# Patient Record
Sex: FEMALE | Race: WHITE | NOT HISPANIC OR LATINO | Employment: FULL TIME | URBAN - NONMETROPOLITAN AREA
[De-identification: names, ages, dates, MRNs, and addresses within clinical notes are randomized per-mention and may not be internally consistent; named-entity substitution may affect disease eponyms.]

---

## 2017-01-11 ENCOUNTER — ANESTHESIA EVENT (OUTPATIENT)
Dept: GASTROENTEROLOGY | Facility: HOSPITAL | Age: 52
End: 2017-01-11

## 2017-01-12 ENCOUNTER — ANESTHESIA (OUTPATIENT)
Dept: GASTROENTEROLOGY | Facility: HOSPITAL | Age: 52
End: 2017-01-12

## 2017-01-12 PROCEDURE — 25010000002 PROPOFOL 10 MG/ML EMULSION: Performed by: NURSE ANESTHETIST, CERTIFIED REGISTERED

## 2017-01-12 RX ORDER — PROPOFOL 10 MG/ML
VIAL (ML) INTRAVENOUS AS NEEDED
Status: DISCONTINUED | OUTPATIENT
Start: 2017-01-12 | End: 2017-01-12 | Stop reason: SURG

## 2017-01-12 RX ORDER — LIDOCAINE HYDROCHLORIDE 20 MG/ML
INJECTION, SOLUTION INFILTRATION; PERINEURAL AS NEEDED
Status: DISCONTINUED | OUTPATIENT
Start: 2017-01-12 | End: 2017-01-12 | Stop reason: SURG

## 2017-01-12 RX ADMIN — PROPOFOL 20 MG: 10 INJECTION, EMULSION INTRAVENOUS at 08:59

## 2017-01-12 RX ADMIN — PROPOFOL 100 MG: 10 INJECTION, EMULSION INTRAVENOUS at 08:56

## 2017-01-12 RX ADMIN — PROPOFOL 30 MG: 10 INJECTION, EMULSION INTRAVENOUS at 09:01

## 2017-01-12 RX ADMIN — LIDOCAINE HYDROCHLORIDE 40 MG: 20 INJECTION, SOLUTION INFILTRATION; PERINEURAL at 08:56

## 2017-01-12 NOTE — ANESTHESIA PREPROCEDURE EVALUATION
Anesthesia Evaluation     Patient summary reviewed and Nursing notes reviewed    No history of anesthetic complications   Airway   Mallampati: I  TM distance: >3 FB  Neck ROM: full  no difficulty expected  Dental - normal exam     Pulmonary - negative pulmonary ROS and normal exam   Cardiovascular - normal exam  Exercise tolerance: good (4-7 METS)  (+) hypertension well controlled, valvular problems/murmurs MVP,     Beta blocker given within 24 hours of surgery  ROS comment: Occasional palpitations    Neuro/Psych- negative ROS  GI/Hepatic/Renal/Endo - negative ROS     Musculoskeletal (-) negative ROS    Abdominal  - normal exam   Substance History - negative use     OB/GYN negative ob/gyn ROS         Other - negative ROS                            Anesthesia Plan    ASA 2     general     intravenous induction   Anesthetic plan and risks discussed with patient and spouse/significant other.

## 2017-01-12 NOTE — ANESTHESIA POSTPROCEDURE EVALUATION
Patient: Lorrie Marmolejo    Procedure Summary     Date Anesthesia Start Anesthesia Stop Room / Location    01/12/17 0852 0913  [facility] / [facility]       Procedure Diagnosis Surgeon Provider    COLONOSCOPY WITH ANESTHESIA (N/A ) Encounter for screening for malignant neoplasm of colon  (Encounter for screening for malignant neoplasm of colon [Z12.11]) MD Paula De La Vega CRNA          Anesthesia Type: general  Last vitals  BP      Temp      Pulse     Resp      SpO2        Post Anesthesia Care and Evaluation    Patient location during evaluation: bedside  Patient participation: complete - patient participated  Level of consciousness: awake  Pain score: 0  Pain management: adequate  Airway patency: patent  Anesthetic complications: No anesthetic complications  Cardiovascular status: acceptable  Respiratory status: acceptable  Hydration status: acceptable

## 2017-01-17 ENCOUNTER — TELEPHONE (OUTPATIENT)
Dept: GASTROENTEROLOGY | Facility: CLINIC | Age: 52
End: 2017-01-17

## 2017-06-12 ENCOUNTER — HOSPITAL ENCOUNTER (OUTPATIENT)
Dept: WOMENS IMAGING | Age: 52
Discharge: HOME | End: 2017-06-12
Payer: COMMERCIAL

## 2017-06-12 DIAGNOSIS — R92.8 ABNORMAL MAMMOGRAM, UNSPECIFIED: ICD-10-CM

## 2017-06-12 PROCEDURE — G0279 TOMOSYNTHESIS, MAMMO: HCPCS

## 2017-12-21 ENCOUNTER — HOSPITAL ENCOUNTER (OUTPATIENT)
Dept: WOMENS IMAGING | Age: 52
Discharge: HOME | End: 2017-12-21
Payer: COMMERCIAL

## 2017-12-21 DIAGNOSIS — Z12.31 VISIT FOR SCREENING MAMMOGRAM: ICD-10-CM

## 2017-12-21 PROCEDURE — 77063 BREAST TOMOSYNTHESIS BI: CPT

## 2018-04-21 ENCOUNTER — HOSPITAL ENCOUNTER (INPATIENT)
Facility: HOSPITAL | Age: 53
LOS: 1 days | Discharge: HOME | End: 2018-04-22
Attending: FAMILY MEDICINE | Admitting: FAMILY MEDICINE

## 2018-04-21 ENCOUNTER — APPOINTMENT (OUTPATIENT)
Dept: CT IMAGING | Facility: HOSPITAL | Age: 53
End: 2018-04-21

## 2018-04-21 ENCOUNTER — APPOINTMENT (OUTPATIENT)
Dept: GENERAL RADIOLOGY | Facility: HOSPITAL | Age: 53
End: 2018-04-21

## 2018-04-21 DIAGNOSIS — K85.90 ACUTE PANCREATITIS, UNSPECIFIED COMPLICATION STATUS, UNSPECIFIED PANCREATITIS TYPE: Primary | ICD-10-CM

## 2018-04-21 LAB
ALBUMIN SERPL-MCNC: 4.2 G/DL (ref 3.5–5)
ALBUMIN/GLOB SERPL: 1.4 G/DL (ref 1.1–2.5)
ALP SERPL-CCNC: 96 U/L (ref 24–120)
ALT SERPL W P-5'-P-CCNC: 67 U/L (ref 0–54)
ANION GAP SERPL CALCULATED.3IONS-SCNC: 9 MMOL/L (ref 4–13)
ARTICHOKE IGE QN: 108 MG/DL (ref 0–99)
AST SERPL-CCNC: 110 U/L (ref 7–45)
BASOPHILS # BLD AUTO: 0.08 10*3/MM3 (ref 0–0.2)
BASOPHILS NFR BLD AUTO: 0.7 % (ref 0–2)
BILIRUB SERPL-MCNC: 0.6 MG/DL (ref 0.1–1)
BILIRUB UR QL STRIP: NEGATIVE
BUN BLD-MCNC: 19 MG/DL (ref 5–21)
BUN/CREAT SERPL: 26 (ref 7–25)
CALCIUM SPEC-SCNC: 9.2 MG/DL (ref 8.4–10.4)
CHLORIDE SERPL-SCNC: 104 MMOL/L (ref 98–110)
CHOLEST SERPL-MCNC: 180 MG/DL (ref 130–200)
CLARITY UR: CLEAR
CO2 SERPL-SCNC: 30 MMOL/L (ref 24–31)
COLOR UR: YELLOW
CREAT BLD-MCNC: 0.73 MG/DL (ref 0.5–1.4)
CRP SERPL-MCNC: <0.5 MG/DL (ref 0–0.99)
DEPRECATED RDW RBC AUTO: 40.2 FL (ref 40–54)
EOSINOPHIL # BLD AUTO: 0.72 10*3/MM3 (ref 0–0.7)
EOSINOPHIL NFR BLD AUTO: 6.6 % (ref 0–4)
ERYTHROCYTE [DISTWIDTH] IN BLOOD BY AUTOMATED COUNT: 12.2 % (ref 12–15)
GFR SERPL CREATININE-BSD FRML MDRD: 84 ML/MIN/1.73
GLOBULIN UR ELPH-MCNC: 3.1 GM/DL
GLUCOSE BLD-MCNC: 110 MG/DL (ref 70–100)
GLUCOSE UR STRIP-MCNC: NEGATIVE MG/DL
HCT VFR BLD AUTO: 40.5 % (ref 37–47)
HDLC SERPL-MCNC: 49 MG/DL
HGB BLD-MCNC: 14.1 G/DL (ref 12–16)
HGB UR QL STRIP.AUTO: NEGATIVE
IMM GRANULOCYTES # BLD: 0.04 10*3/MM3 (ref 0–0.03)
IMM GRANULOCYTES NFR BLD: 0.4 % (ref 0–5)
KETONES UR QL STRIP: NEGATIVE
LDLC/HDLC SERPL: 2.53 {RATIO}
LEUKOCYTE ESTERASE UR QL STRIP.AUTO: NEGATIVE
LIPASE SERPL-CCNC: ABNORMAL U/L (ref 23–203)
LYMPHOCYTES # BLD AUTO: 2.34 10*3/MM3 (ref 0.72–4.86)
LYMPHOCYTES NFR BLD AUTO: 21.5 % (ref 15–45)
MCH RBC QN AUTO: 31.3 PG (ref 28–32)
MCHC RBC AUTO-ENTMCNC: 34.8 G/DL (ref 33–36)
MCV RBC AUTO: 90 FL (ref 82–98)
MONOCYTES # BLD AUTO: 0.81 10*3/MM3 (ref 0.19–1.3)
MONOCYTES NFR BLD AUTO: 7.5 % (ref 4–12)
NEUTROPHILS # BLD AUTO: 6.88 10*3/MM3 (ref 1.87–8.4)
NEUTROPHILS NFR BLD AUTO: 63.3 % (ref 39–78)
NITRITE UR QL STRIP: NEGATIVE
NRBC BLD MANUAL-RTO: 0 /100 WBC (ref 0–0)
PH UR STRIP.AUTO: <=5 [PH] (ref 5–8)
PLATELET # BLD AUTO: 273 10*3/MM3 (ref 130–400)
PMV BLD AUTO: 10.2 FL (ref 6–12)
POTASSIUM BLD-SCNC: 3.8 MMOL/L (ref 3.5–5.3)
PROT SERPL-MCNC: 7.3 G/DL (ref 6.3–8.7)
PROT UR QL STRIP: NEGATIVE
RBC # BLD AUTO: 4.5 10*6/MM3 (ref 4.2–5.4)
SODIUM BLD-SCNC: 143 MMOL/L (ref 135–145)
SP GR UR STRIP: 1.01 (ref 1–1.03)
TRIGL SERPL-MCNC: 35 MG/DL (ref 0–149)
TROPONIN I SERPL-MCNC: <0.012 NG/ML (ref 0–0.03)
TSH SERPL DL<=0.05 MIU/L-ACNC: 2.73 MIU/ML (ref 0.47–4.68)
UROBILINOGEN UR QL STRIP: NORMAL
WBC NRBC COR # BLD: 10.87 10*3/MM3 (ref 4.8–10.8)

## 2018-04-21 PROCEDURE — 74177 CT ABD & PELVIS W/CONTRAST: CPT

## 2018-04-21 PROCEDURE — 99285 EMERGENCY DEPT VISIT HI MDM: CPT

## 2018-04-21 PROCEDURE — 85025 COMPLETE CBC W/AUTO DIFF WBC: CPT | Performed by: FAMILY MEDICINE

## 2018-04-21 PROCEDURE — 80061 LIPID PANEL: CPT | Performed by: NURSE PRACTITIONER

## 2018-04-21 PROCEDURE — 93005 ELECTROCARDIOGRAM TRACING: CPT | Performed by: FAMILY MEDICINE

## 2018-04-21 PROCEDURE — 25010000002 IOPAMIDOL 61 % SOLUTION: Performed by: FAMILY MEDICINE

## 2018-04-21 PROCEDURE — 84484 ASSAY OF TROPONIN QUANT: CPT | Performed by: FAMILY MEDICINE

## 2018-04-21 PROCEDURE — 25010000002 ONDANSETRON PER 1 MG: Performed by: FAMILY MEDICINE

## 2018-04-21 PROCEDURE — 86140 C-REACTIVE PROTEIN: CPT | Performed by: NURSE PRACTITIONER

## 2018-04-21 PROCEDURE — 81003 URINALYSIS AUTO W/O SCOPE: CPT | Performed by: FAMILY MEDICINE

## 2018-04-21 PROCEDURE — 84443 ASSAY THYROID STIM HORMONE: CPT | Performed by: NURSE PRACTITIONER

## 2018-04-21 PROCEDURE — 71045 X-RAY EXAM CHEST 1 VIEW: CPT

## 2018-04-21 PROCEDURE — 83690 ASSAY OF LIPASE: CPT | Performed by: FAMILY MEDICINE

## 2018-04-21 PROCEDURE — 25010000002 ENOXAPARIN PER 10 MG: Performed by: NURSE PRACTITIONER

## 2018-04-21 PROCEDURE — 93010 ELECTROCARDIOGRAM REPORT: CPT | Performed by: INTERNAL MEDICINE

## 2018-04-21 PROCEDURE — 80053 COMPREHEN METABOLIC PANEL: CPT | Performed by: FAMILY MEDICINE

## 2018-04-21 RX ORDER — FAMOTIDINE 10 MG/ML
20 INJECTION, SOLUTION INTRAVENOUS EVERY 12 HOURS SCHEDULED
Status: DISCONTINUED | OUTPATIENT
Start: 2018-04-21 | End: 2018-04-22 | Stop reason: HOSPADM

## 2018-04-21 RX ORDER — HYDROCODONE BITARTRATE AND ACETAMINOPHEN 7.5; 325 MG/1; MG/1
1 TABLET ORAL EVERY 4 HOURS PRN
Status: DISCONTINUED | OUTPATIENT
Start: 2018-04-21 | End: 2018-04-22 | Stop reason: HOSPADM

## 2018-04-21 RX ORDER — ACETAMINOPHEN 325 MG/1
650 TABLET ORAL EVERY 4 HOURS PRN
Status: DISCONTINUED | OUTPATIENT
Start: 2018-04-21 | End: 2018-04-22 | Stop reason: HOSPADM

## 2018-04-21 RX ORDER — POLYETHYLENE GLYCOL 3350 17 G/17G
17 POWDER, FOR SOLUTION ORAL DAILY
COMMUNITY

## 2018-04-21 RX ORDER — MAGNESIUM GLUCONATE 27 MG(500)
27 TABLET ORAL DAILY
COMMUNITY

## 2018-04-21 RX ORDER — HYDRALAZINE HYDROCHLORIDE 20 MG/ML
10 INJECTION INTRAMUSCULAR; INTRAVENOUS EVERY 6 HOURS PRN
Status: DISCONTINUED | OUTPATIENT
Start: 2018-04-21 | End: 2018-04-22 | Stop reason: HOSPADM

## 2018-04-21 RX ORDER — SODIUM CHLORIDE 9 MG/ML
150 INJECTION, SOLUTION INTRAVENOUS CONTINUOUS
Status: DISCONTINUED | OUTPATIENT
Start: 2018-04-21 | End: 2018-04-21

## 2018-04-21 RX ORDER — MEPERIDINE HYDROCHLORIDE 25 MG/ML
25 INJECTION INTRAMUSCULAR; INTRAVENOUS; SUBCUTANEOUS ONCE
Status: COMPLETED | OUTPATIENT
Start: 2018-04-21 | End: 2018-04-21

## 2018-04-21 RX ORDER — MEPERIDINE HYDROCHLORIDE 25 MG/ML
25 INJECTION INTRAMUSCULAR; INTRAVENOUS; SUBCUTANEOUS
Status: DISCONTINUED | OUTPATIENT
Start: 2018-04-21 | End: 2018-04-21 | Stop reason: RX

## 2018-04-21 RX ORDER — SODIUM CHLORIDE 0.9 % (FLUSH) 0.9 %
1-10 SYRINGE (ML) INJECTION AS NEEDED
Status: DISCONTINUED | OUTPATIENT
Start: 2018-04-21 | End: 2018-04-22 | Stop reason: HOSPADM

## 2018-04-21 RX ORDER — SODIUM CHLORIDE, SODIUM LACTATE, POTASSIUM CHLORIDE, CALCIUM CHLORIDE 600; 310; 30; 20 MG/100ML; MG/100ML; MG/100ML; MG/100ML
100 INJECTION, SOLUTION INTRAVENOUS CONTINUOUS
Status: DISCONTINUED | OUTPATIENT
Start: 2018-04-21 | End: 2018-04-22

## 2018-04-21 RX ORDER — ONDANSETRON 2 MG/ML
4 INJECTION INTRAMUSCULAR; INTRAVENOUS ONCE
Status: COMPLETED | OUTPATIENT
Start: 2018-04-21 | End: 2018-04-21

## 2018-04-21 RX ORDER — FLUTICASONE PROPIONATE 50 MCG
2 SPRAY, SUSPENSION (ML) NASAL NIGHTLY
Status: DISCONTINUED | OUTPATIENT
Start: 2018-04-21 | End: 2018-04-22 | Stop reason: HOSPADM

## 2018-04-21 RX ORDER — ONDANSETRON 2 MG/ML
4 INJECTION INTRAMUSCULAR; INTRAVENOUS EVERY 6 HOURS PRN
Status: DISCONTINUED | OUTPATIENT
Start: 2018-04-21 | End: 2018-04-22 | Stop reason: HOSPADM

## 2018-04-21 RX ORDER — ASPIRIN 325 MG
325 TABLET ORAL ONCE
Status: COMPLETED | OUTPATIENT
Start: 2018-04-21 | End: 2018-04-21

## 2018-04-21 RX ADMIN — FLUTICASONE PROPIONATE 2 SPRAY: 50 SPRAY, METERED NASAL at 21:19

## 2018-04-21 RX ADMIN — MEPERIDINE HYDROCHLORIDE 25 MG: 25 INJECTION INTRAMUSCULAR; INTRAVENOUS; SUBCUTANEOUS at 04:04

## 2018-04-21 RX ADMIN — FAMOTIDINE 20 MG: 10 INJECTION, SOLUTION INTRAVENOUS at 09:28

## 2018-04-21 RX ADMIN — FAMOTIDINE 20 MG: 10 INJECTION, SOLUTION INTRAVENOUS at 21:18

## 2018-04-21 RX ADMIN — SODIUM CHLORIDE 150 ML/HR: 9 INJECTION, SOLUTION INTRAVENOUS at 04:00

## 2018-04-21 RX ADMIN — SODIUM CHLORIDE, POTASSIUM CHLORIDE, SODIUM LACTATE AND CALCIUM CHLORIDE 125 ML/HR: 600; 310; 30; 20 INJECTION, SOLUTION INTRAVENOUS at 17:43

## 2018-04-21 RX ADMIN — ASPIRIN 325 MG: 325 TABLET, COATED ORAL at 03:41

## 2018-04-21 RX ADMIN — ENOXAPARIN SODIUM 40 MG: 40 INJECTION SUBCUTANEOUS at 09:27

## 2018-04-21 RX ADMIN — ONDANSETRON 4 MG: 2 INJECTION INTRAMUSCULAR; INTRAVENOUS at 04:03

## 2018-04-21 RX ADMIN — IOPAMIDOL 100 ML: 612 INJECTION, SOLUTION INTRAVENOUS at 04:19

## 2018-04-21 RX ADMIN — SODIUM CHLORIDE, POTASSIUM CHLORIDE, SODIUM LACTATE AND CALCIUM CHLORIDE 125 ML/HR: 600; 310; 30; 20 INJECTION, SOLUTION INTRAVENOUS at 09:53

## 2018-04-21 RX ADMIN — HYDROCODONE BITARTRATE AND ACETAMINOPHEN 1 TABLET: 7.5; 325 TABLET ORAL at 22:00

## 2018-04-21 NOTE — PLAN OF CARE
Problem: Patient Care Overview  Goal: Plan of Care Review  Outcome: Ongoing (interventions implemented as appropriate)   04/21/18 7597   Coping/Psychosocial   Plan of Care Reviewed With patient;spouse   Plan of Care Review   Progress improving   OTHER   Outcome Summary Pt has had no c/o of pain this shift. No nausea or vomiting. Remains npo. Scds and lovenox started. Pt has been resting. Will continue to monitor.      Goal: Individualization and Mutuality  Outcome: Ongoing (interventions implemented as appropriate)    Goal: Discharge Needs Assessment  Outcome: Ongoing (interventions implemented as appropriate)    Goal: Interprofessional Rounds/Family Conf  Outcome: Ongoing (interventions implemented as appropriate)      Problem: Pancreatitis, Acute/Chronic (Adult)  Goal: Signs and Symptoms of Listed Potential Problems Will be Absent, Minimized or Managed (Pancreatitis, Acute/Chronic)  Outcome: Ongoing (interventions implemented as appropriate)

## 2018-04-21 NOTE — H&P
[facility] Medicine Services  HISTORY AND PHYSICAL    Date of Admission: 4/21/2018  Primary Care Physician: Oliverio Morrow MD    Subjective     Chief Complaint: epigastric pain     History of Present Illness  The patient is a 52 year old  female who presented to [facility] emergency department complaining of severe sudden epigastric pain.  She states that she had never had any pain like this before.  She denies alcohol use.  She had a chicken breast and baked potato for supper last night.  She states that she lost 50 pounds last year on a low carb diet and has not been as faithful this year with her carbohydrate intake.  She denies any fatty foods on the night prior to admission.  She stated that she felt like she was having a heart attack and so they stopped at the EMS station in [facility] but decided to continue to come to the hospital via private car because the pain had let up some.  She did not have any acute EKG changes per ER and her troponin was negative.  She was found to have a lipase of 10,000.  She stated that she has been having fleeting nausea for the past week as well and just started to having vomiting early this morning with the severe pain.     Review of Systems   Constitutional: Positive for appetite change. Negative for activity change, chills and fever.   HENT: Negative for hearing loss, nosebleeds, tinnitus and trouble swallowing.    Eyes: Negative for visual disturbance.   Respiratory: Negative for cough, chest tightness, shortness of breath and wheezing.    Cardiovascular: Positive for chest pain. Negative for palpitations and leg swelling.   Gastrointestinal: Positive for abdominal pain, nausea and vomiting. Negative for abdominal distention, blood in stool, constipation and diarrhea.   Endocrine: Negative for cold intolerance, heat intolerance, polydipsia, polyphagia and polyuria.   Genitourinary: Negative for decreased urine  volume, difficulty urinating, dysuria, flank pain, frequency and hematuria.   Musculoskeletal: Negative for arthralgias, joint swelling and myalgias.   Skin: Negative for rash.   Allergic/Immunologic: Negative for immunocompromised state.   Neurological: Positive for weakness. Negative for dizziness, syncope, light-headedness and headaches.   Hematological: Negative for adenopathy. Does not bruise/bleed easily.   Psychiatric/Behavioral: Negative for confusion and sleep disturbance. The patient is not nervous/anxious.       Otherwise complete ROS reviewed and negative except as mentioned in the HPI.    Past Medical History:   Past Medical History:   Diagnosis Date   • Disease of thyroid gland    • Hyperlipidemia    • Hypertension    • Sebaceous hyperplasia    • Squamous cell carcinoma 08/08/2016    left cheek     Past Surgical History:  Past Surgical History:   Procedure Laterality Date   • CHOLECYSTECTOMY     • COLONOSCOPY N/A 1/12/2017    Procedure: COLONOSCOPY WITH ANESTHESIA;  Surgeon: Jose R Graves MD;  Location: [facility] ENDOSCOPY;  Service:    • EXCISION LESION  07/21/2016    sebacous hyperplasia   • HYSTERECTOMY     • SQUAMOUS CELL CARCINOMA EXCISION  08/08/2016    left cheek     Social History:  reports that she has never smoked. She does not have any smokeless tobacco history on file. She reports that she does not drink alcohol.    Family History: family history includes Hyperlipidemia in her other; Hypertension in her other.     Allergies:  No Known Allergies     Medications:  Prior to Admission medications    Medication Sig Start Date End Date Taking? Authorizing Provider   magnesium gluconate (MAGONATE) 500 MG tablet Take 27 mg by mouth 2 (Two) Times a Day.   Yes Historical Provider, MD   polyethylene glycol (MIRALAX) packet Take 17 g by mouth Daily.   Yes Historical Provider, MD   atorvastatin (LIPITOR) 40 MG tablet Take 40 mg by mouth Daily.    Historical Provider, MD   baclofen (LIORESAL) 10 MG tablet  "Take 10 mg by mouth 2 (Two) Times a Day.    Historical Provider, MD   Biotin (BIOTIN MAXIMUM STRENGTH) 10 MG tablet Take  by mouth Daily.    Historical Provider, MD   cetirizine (zyrTEC) 10 MG tablet Take 10 mg by mouth Daily.    Historical Provider, MD   lisinopril (PRINIVIL,ZESTRIL) 20 MG tablet Take 20 mg by mouth Daily.    Historical Provider, MD   omeprazole (priLOSEC) 20 MG capsule Take 20 mg by mouth 2 (Two) Times a Day.    Historical Provider, MD   oxybutynin (DITROPAN) 5 MG tablet Take 5 mg by mouth Daily.    Historical Provider, MD     Objective     Vital Signs: /78 (BP Location: Left arm, Patient Position: Lying)   Pulse 58   Temp 97.9 °F (36.6 °C) (Oral)   Resp 18   Ht 170.2 cm (67\")   Wt 61.9 kg (136 lb 8 oz)   SpO2 98%   BMI 21.38 kg/m²      Physical Exam   Constitutional: She is oriented to person, place, and time. She appears well-developed and well-nourished.   HENT:   Head: Normocephalic and atraumatic.   Eyes: Conjunctivae and EOM are normal. Pupils are equal, round, and reactive to light.   Neck: Neck supple. No JVD present. No thyromegaly present.   Cardiovascular: Normal rate, regular rhythm, normal heart sounds and intact distal pulses.  Exam reveals no gallop and no friction rub.    No murmur heard.  Pulmonary/Chest: Effort normal and breath sounds normal. No respiratory distress. She has no wheezes. She has no rales. She exhibits no tenderness.   Abdominal: Soft. Bowel sounds are normal. She exhibits no distension. There is no tenderness. There is no rebound and no guarding.   Musculoskeletal: Normal range of motion. She exhibits no edema, tenderness or deformity.   Lymphadenopathy:     She has no cervical adenopathy.   Neurological: She is alert and oriented to person, place, and time. She displays normal reflexes. No cranial nerve deficit. She exhibits normal muscle tone.   Skin: Skin is warm and dry. No rash noted.   Psychiatric: She has a normal mood and affect. Her behavior " is normal. Judgment and thought content normal.   Nursing note and vitals reviewed.    Results Reviewed:  Lab Results (last 24 hours)     Procedure Component Value Units Date/Time    TSH [[id number]]  (Normal) Collected:  04/21/18 0644    Specimen:  Blood Updated:  04/21/18 0750     TSH 2.730 mIU/mL     Lipid Panel [[id number]]  (Abnormal) Collected:  04/21/18 0644    Specimen:  Blood Updated:  04/21/18 0731     Total Cholesterol 180 mg/dL      Triglycerides 35 mg/dL      HDL Cholesterol 49 (L) mg/dL      LDL Cholesterol  108 (H) mg/dL      LDL/HDL Ratio 2.53    C-reactive Protein [[id number]]  (Normal) Collected:  04/21/18 0644    Specimen:  Blood Updated:  04/21/18 0723     C-Reactive Protein <0.50 mg/dL     Troponin [[id number]]  (Normal) Collected:  04/21/18 0308    Specimen:  Blood Updated:  04/21/18 0423     Troponin I <0.012 ng/mL     Lipase [[id number]]  (Abnormal) Collected:  04/21/18 0308    Specimen:  Blood Updated:  04/21/18 0350     Lipase 10,126 (H) U/L     Urinalysis With / Microscopic If Indicated - Urine, Clean Catch [[id number]]  (Normal) Collected:  04/21/18 0329    Specimen:  Urine from Urine, Clean Catch Updated:  04/21/18 0339     Color, UA Yellow     Appearance, UA Clear     pH, UA <=5.0     Specific Gravity, UA 1.012     Glucose, UA Negative     Ketones, UA Negative     Bilirubin, UA Negative     Blood, UA Negative     Protein, UA Negative     Leuk Esterase, UA Negative     Nitrite, UA Negative     Urobilinogen, UA 0.2 E.U./dL    Narrative:       Urine microscopic not indicated.    Comprehensive Metabolic Panel [[id number]]  (Abnormal) Collected:  04/21/18 0308    Specimen:  Blood Updated:  04/21/18 0327     Glucose 110 (H) mg/dL      BUN 19 mg/dL      Creatinine 0.73 mg/dL      Sodium 143 mmol/L      Potassium 3.8 mmol/L      Chloride 104 mmol/L      CO2 30.0 mmol/L      Calcium 9.2 mg/dL      Total Protein 7.3 g/dL      Albumin 4.20 g/dL      ALT (SGPT) 67 (H) U/L      AST (SGOT) 110 (H) U/L       Alkaline Phosphatase 96 U/L      Total Bilirubin 0.6 mg/dL      eGFR Non African Amer 84 mL/min/1.73      Globulin 3.1 gm/dL      A/G Ratio 1.4 g/dL      BUN/Creatinine Ratio 26.0 (H)     Anion Gap 9.0 mmol/L     CBC & Differential [[id number]] Collected:  04/21/18 0308    Specimen:  Blood Updated:  04/21/18 0316    Narrative:       The following orders were created for panel order CBC & Differential.  Procedure                               Abnormality         Status                     ---------                               -----------         ------                     CBC Auto Differential[[id number]]         Abnormal            Final result                 Please view results for these tests on the individual orders.    CBC Auto Differential [[id number]]  (Abnormal) Collected:  04/21/18 0308    Specimen:  Blood Updated:  04/21/18 0316     WBC 10.87 (H) 10*3/mm3      RBC 4.50 10*6/mm3      Hemoglobin 14.1 g/dL      Hematocrit 40.5 %      MCV 90.0 fL      MCH 31.3 pg      MCHC 34.8 g/dL      RDW 12.2 %      RDW-SD 40.2 fl      MPV 10.2 fL      Platelets 273 10*3/mm3      Neutrophil % 63.3 %      Lymphocyte % 21.5 %      Monocyte % 7.5 %      Eosinophil % 6.6 (H) %      Basophil % 0.7 %      Immature Grans % 0.4 %      Neutrophils, Absolute 6.88 10*3/mm3      Lymphocytes, Absolute 2.34 10*3/mm3      Monocytes, Absolute 0.81 10*3/mm3      Eosinophils, Absolute 0.72 (H) 10*3/mm3      Basophils, Absolute 0.08 10*3/mm3      Immature Grans, Absolute 0.04 (H) 10*3/mm3      nRBC 0.0 /100 WBC         Imaging Results (last 24 hours)     Procedure Component Value Units Date/Time    CT Abdomen Pelvis With Contrast [[id number]] Collected:  04/21/18 0800     Updated:  04/21/18 0805    Narrative:       CT ABDOMEN PELVIS W CONTRAST- 4/21/2018 4:12 AM CDT     HISTORY: Epigastric pain and elevated lipase       COMPARISON: None.      DOSE LENGTH PRODUCT: 291 mGy cm. Automated exposure control was also  utilized to decrease patient  radiation dose.     TECHNIQUE: Following the intravenous administration of contrast, helical  CT tomographic images of the abdomen and pelvis were acquired.  Multiplanar reformatted images were provided for review.      FINDINGS:   The lung bases and base of the heart are unremarkable.      LIVER: No focal liver lesion. The hepatic vasculature is patent.      BILIARY SYSTEM: The gallbladder has been removed. There is no  intrahepatic or extrahepatic ductal dilation.      PANCREAS: No focal pancreatic lesion.      SPLEEN: Unremarkable.      KIDNEYS AND ADRENALS: Bilateral kidneys and adrenal glands are  unremarkable. The ureters are decompressed and normal in appearance.     RETROPERITONEUM: No mass, lymphadenopathy or hemorrhage.      GI TRACT: No evidence of obstruction or bowel wall thickening.      OTHER: There is no mesenteric mass, lymphadenopathy or fluid collection.  The abdominopelvic vasculature is patent. The osseous structures and  soft tissues demonstrate no worrisome lesions.          PELVIS: No mass lesion, fluid collection or significant lymphadenopathy  is seen in the pelvis. The urinary bladder is normal in appearance.       Impression:       1. No evidence of acute abdominopelvic process. Specifically, no  evidence of acute pancreatitis.     [facility] preliminary [facility]. I  agree with the preliminary interpretation.        This report was finalized on 04/21/2018 08:02 by Dr. Michael Gillis MD.    XR Chest 1 View [[id number]] Updated:  04/21/18 0422        I have personally reviewed and interpreted the radiology studies and ECG obtained at time of admission.     Assessment / Plan     Assessment:   Hospital Problem List     Acute pancreatitis        1.  Acute idiopathic pancreatitis  2.  Nausea and vomiting  3.  Acute epigastric pain  4.  Essential hypertension  5.  Hyperlipidemia  6.  Hypothyroidism with adequate TSH    Plan:   1.  Admit to the hospitalist  service  2.  NPO   3.  IVF with LR at 125 ml/hr  4.  Zofran 4 mg IV as needed  5.  Demerol 25 mg IV as needed  6.  CBC and BMP in AM  7.  Check Lipid profile, CRP  8.  Lipase in AM  9.  Check TSH   10.  Resume home medications as ordered  11.  Pepcid  12.  Work up ongoing     Code Status: Full     I discussed the patients findings and my recommendations with the patient,  and Dr. Gill.     Estimated length of stay 2 days.     MONTSE Jimenez   04/21/18   8:26 AM      I personally evaluated and examined the patient in conjunction with MONTSE Lehman and agree with the assessment, treatment plan, and disposition of the patient as recorded by her. My history, exam, and further recommendations are: I have reviewed and agree with the plan. Alvin.

## 2018-04-21 NOTE — ED PROVIDER NOTES
[facility]  eMERGENCY dEPARTMENT eNCOUnter      Pt Name: Lorrie Marmolejo  MRN: [id number]  Birthdate 1965  Date of evaluation: 4/21/2018      CHIEF COMPLAINT       Chief Complaint   Patient presents with   • Abdominal Pain       Nurses Notes reviewed and I agree except as noted in the HPI.      HISTORY OF PRESENT ILLNESS    Lorrie Marmolejo is a 52 y.o. female who presents     She presents for abdominal pain.  Patient states that she began to have abdominal pain yesterday but then it was not that bad.  Then she woke up in the middle the night and she had severe cramping abdominal pain in the epigastric area.  She had no treatment prior to arrival.  She states the pain goes right straight back to her back.  She is pale on arrival mildly diaphoretic.       REVIEW OF SYSTEMS     Review of Systems  CONSTITUTION: As per the HPI otherwise negative.    HENT: No congestion, no dental problems, no ear pain or discharge, , no nuchal rigidity, no meningeal signs.  EYES: No drainage, no itching, no photophobia, no visual disturbance  RESPIRATORY: No apnea, no chest tightness, no cough, no shortness of breath, no stridor, no wheezing  CARDIOVASCULAR: No chest pain, no palpitations, no leg swelling  GI: As per the HPI otherwise negative.  ENDOCRINE: No polydipsia, no polyphagia, no polyuria, no cold or heat intolerance  : No difficulty urinating, no dyspareunia, no dysuria, no flank pain, no frequency, no genital sore, no hematuria, no menstrual problem if female, no decreased urination, no hesitation of urination, no vaginal discharge if female, no vaginal pain if female no penile pain if male  ALLERG/IMMUNO:  No env or food allergies, not immunocompromised  NEUROLOGICAL: No dizziness, no facial asymmetry, no Headaches, no Light-headedness, no numbness nor paresthesias, no seizures, no speech difficulty, No syncope, no tremors, no weakness  HEMATOLOGIC: No adenopathy, no unusual bleeding or  "bruising  MUSCULOSKELETAL: No arthralgia, no back pain, no joint swelling, no myalgias, no neck pain, no neck stiffness, Pulses 2/4 in all extremities.  SKIN: No rash, no color change, no pallor, no wound  PSYCH: No agitation, no behavior problem, no confusion, no decreased concentration, no hallucinations, no suicidal ideation, no homicidal ideation, no self injury, no sleep disturbance  All other systems negative.    PAST MEDICAL HISTORY     Past Medical History:   Diagnosis Date   • Disease of thyroid gland    • Hyperlipidemia    • Hypertension    • Sebaceous hyperplasia    • Squamous cell carcinoma 08/08/2016    left cheek       SURGICAL HISTORY      has a past surgical history that includes Cholecystectomy; Hysterectomy; Squamous cell carcinoma excision (08/08/2016); Excision Lesion (07/21/2016); and Colonoscopy (N/A, 1/12/2017).    CURRENT MEDICATIONS        Medication List      ASK your doctor about these medications    atorvastatin 40 MG tablet  Commonly known as:  LIPITOR     baclofen 10 MG tablet  Commonly known as:  LIORESAL     BIOTIN MAXIMUM STRENGTH 10 MG tablet  Generic drug:  Biotin     cetirizine 10 MG tablet  Commonly known as:  zyrTEC     lisinopril 20 MG tablet  Commonly known as:  PRINIVIL,ZESTRIL     magnesium gluconate 500 MG tablet  Commonly known as:  MAGONATE     omeprazole 20 MG capsule  Commonly known as:  priLOSEC     oxybutynin 5 MG tablet  Commonly known as:  DITROPAN     polyethylene glycol packet  Commonly known as:  MIRALAX          ALLERGIES     has No Known Allergies.    FAMILY HISTORY     indicated that the status of her neg hx is unknown.    family history is not on file.    SOCIAL HISTORY      reports that she has never smoked. She does not have any smokeless tobacco history on file. She reports that she does not drink alcohol.    PHYSICAL EXAM     INITIAL VITALS:  height is 170.2 cm (67\") and weight is 61.2 kg (135 lb). Her temperature is 98.2 °F (36.8 °C). Her blood pressure " is 106/64 and her pulse is 60. Her respiration is 18 and oxygen saturation is 97%.    Physical Exam    CONSTITUTIONAL: Well developed, well nourished, not diaphoretic nor distressed  HENT: Normocephalic, atraumatic, oropharynx clear and moist  EYES: PERRL, EOM normal, no discharge, no scleral icterus  NECK: ROM normal, supple, no tracheal deviation nor JVD, no stridor  CARDIOVASCULAR: Normal rate and rhythm, heart sounds normal, no rub no gallop, intact distal pulses, normal cap refill  PULMONARY: Normal effort and breath sounds, no distress, no wheezes, rhonchi or rales, no chest tenderness  ABDOMINAL: Soft, Mandeep epigastric area., no guarding, no mass, no rebound, no hernia  GENITOURINARY/ANORECTAL: deferred  MUSCULOSKELETAL: ROM normal, no tenderness nor deformity, no edema  NEUROLOGICAL: Alert, oriented x 3, DTRs normal, CN x 10 normal, normal tone  SKIN: Warm, dry, no erythema, no rash, normal color  PSYCH: Mood and affect normal, behavior normal, thought content and judgement normal.      DIFFERENTIAL DIAGNOSIS:       DIAGNOSTIC RESULTS     EKG: All EKG's are interpreted by the Emergency Department Physician who either signs or Co-signs this chart in the absence of a cardiologist.      RADIOLOGY: non-plain film images(s) such as CT, Ultrasound and MRI are read by the radiologist.  Plain radiographic images are visualized and preliminarily interpreted by the emergency physician unless otherwise stated below.  No results found.           LABS:   Lab Results (last 24 hours)     Procedure Component Value Units Date/Time    CBC & Differential [[id number]] Collected:  04/21/18 0308    Specimen:  Blood Updated:  04/21/18 0316    Narrative:       The following orders were created for panel order CBC & Differential.  Procedure                               Abnormality         Status                     ---------                               -----------         ------                     CBC Auto  Differential[[id number]]         Abnormal            Final result                 Please view results for these tests on the individual orders.    Comprehensive Metabolic Panel [[id number]]  (Abnormal) Collected:  04/21/18 0308    Specimen:  Blood Updated:  04/21/18 0327     Glucose 110 (H) mg/dL      BUN 19 mg/dL      Creatinine 0.73 mg/dL      Sodium 143 mmol/L      Potassium 3.8 mmol/L      Chloride 104 mmol/L      CO2 30.0 mmol/L      Calcium 9.2 mg/dL      Total Protein 7.3 g/dL      Albumin 4.20 g/dL      ALT (SGPT) 67 (H) U/L      AST (SGOT) 110 (H) U/L      Alkaline Phosphatase 96 U/L      Total Bilirubin 0.6 mg/dL      eGFR Non African Amer 84 mL/min/1.73      Globulin 3.1 gm/dL      A/G Ratio 1.4 g/dL      BUN/Creatinine Ratio 26.0 (H)     Anion Gap 9.0 mmol/L     Lipase [[id number]]  (Abnormal) Collected:  04/21/18 0308    Specimen:  Blood Updated:  04/21/18 0350     Lipase 10,126 (H) U/L     CBC Auto Differential [[id number]]  (Abnormal) Collected:  04/21/18 0308    Specimen:  Blood Updated:  04/21/18 0316     WBC 10.87 (H) 10*3/mm3      RBC 4.50 10*6/mm3      Hemoglobin 14.1 g/dL      Hematocrit 40.5 %      MCV 90.0 fL      MCH 31.3 pg      MCHC 34.8 g/dL      RDW 12.2 %      RDW-SD 40.2 fl      MPV 10.2 fL      Platelets 273 10*3/mm3      Neutrophil % 63.3 %      Lymphocyte % 21.5 %      Monocyte % 7.5 %      Eosinophil % 6.6 (H) %      Basophil % 0.7 %      Immature Grans % 0.4 %      Neutrophils, Absolute 6.88 10*3/mm3      Lymphocytes, Absolute 2.34 10*3/mm3      Monocytes, Absolute 0.81 10*3/mm3      Eosinophils, Absolute 0.72 (H) 10*3/mm3      Basophils, Absolute 0.08 10*3/mm3      Immature Grans, Absolute 0.04 (H) 10*3/mm3      nRBC 0.0 /100 WBC     Troponin [[id number]]  (Normal) Collected:  04/21/18 0308    Specimen:  Blood Updated:  04/21/18 0423     Troponin I <0.012 ng/mL     Urinalysis With / Microscopic If Indicated - Urine, Clean Catch [[id number]]  (Normal) Collected:  04/21/18 0329    Specimen:   Urine from Urine, Clean Catch Updated:  04/21/18 0339     Color, UA Yellow     Appearance, UA Clear     pH, UA <=5.0     Specific Gravity, UA 1.012     Glucose, UA Negative     Ketones, UA Negative     Bilirubin, UA Negative     Blood, UA Negative     Protein, UA Negative     Leuk Esterase, UA Negative     Nitrite, UA Negative     Urobilinogen, UA 0.2 E.U./dL    Narrative:       Urine microscopic not indicated.          EMERGENCY DEPARTMENT COURSE:   Vitals:    Vitals:    04/21/18 0446 04/21/18 0501 04/21/18 0516 04/21/18 0532   BP: 120/71 113/70 105/69 106/64   Pulse: 75 67 63 60   Resp:       Temp:       SpO2: 97% 96% 97% 97%   Weight:       Height:           The patient was given the following medications:  Medications   sodium chloride 0.9 % infusion (150 mL/hr Intravenous New Bag 4/21/18 0400)   aspirin tablet 325 mg (325 mg Oral Given 4/21/18 0341)   meperidine (DEMEROL) injection 25 mg (25 mg Intravenous Given 4/21/18 0404)   ondansetron (ZOFRAN) injection 4 mg (4 mg Intravenous Given 4/21/18 0403)   iopamidol (ISOVUE-300) 61 % injection 100 mL (100 mL Intravenous Given 4/21/18 0419)       ED Course       CRITICAL CARE:  none    CONSULTS:  none    PROCEDURES:  None    FINAL IMPRESSION      1. Acute pancreatitis, unspecified complication status, unspecified pancreatitis type          DISPOSITION/PLAN   Patient admitted to the service of Dr. Vera in improved condition.  She is pain-free at this time.      PATIENT REFERRED TO:  No follow-up provider specified.    DISCHARGE MEDICATIONS:     Medication List      ASK your doctor about these medications    atorvastatin 40 MG tablet  Commonly known as:  LIPITOR     baclofen 10 MG tablet  Commonly known as:  LIORESAL     BIOTIN MAXIMUM STRENGTH 10 MG tablet  Generic drug:  Biotin     cetirizine 10 MG tablet  Commonly known as:  zyrTEC     lisinopril 20 MG tablet  Commonly known as:  PRINIVIL,ZESTRIL     magnesium gluconate 500 MG tablet  Commonly known as:   MAGONATE     omeprazole 20 MG capsule  Commonly known as:  priLOSEC     oxybutynin 5 MG tablet  Commonly known as:  DITROPAN     polyethylene glycol packet  Commonly known as:  MIRALAX          (Please note that portions of this note were completed with a voice recognition program.)    Suni Guzman, DO Suni Guzman DO  04/21/18 0573

## 2018-04-22 VITALS
SYSTOLIC BLOOD PRESSURE: 122 MMHG | WEIGHT: 136.5 LBS | HEIGHT: 67 IN | BODY MASS INDEX: 21.43 KG/M2 | TEMPERATURE: 98.3 F | RESPIRATION RATE: 18 BRPM | HEART RATE: 75 BPM | DIASTOLIC BLOOD PRESSURE: 62 MMHG | OXYGEN SATURATION: 97 %

## 2018-04-22 LAB
ANION GAP SERPL CALCULATED.3IONS-SCNC: 8 MMOL/L (ref 4–13)
BASOPHILS # BLD AUTO: 0.07 10*3/MM3 (ref 0–0.2)
BASOPHILS NFR BLD AUTO: 1.1 % (ref 0–2)
BUN BLD-MCNC: 9 MG/DL (ref 5–21)
BUN/CREAT SERPL: 12.9 (ref 7–25)
CALCIUM SPEC-SCNC: 8.7 MG/DL (ref 8.4–10.4)
CHLORIDE SERPL-SCNC: 105 MMOL/L (ref 98–110)
CO2 SERPL-SCNC: 30 MMOL/L (ref 24–31)
CREAT BLD-MCNC: 0.7 MG/DL (ref 0.5–1.4)
CRP SERPL-MCNC: <0.5 MG/DL (ref 0–0.99)
DEPRECATED RDW RBC AUTO: 41.6 FL (ref 40–54)
EOSINOPHIL # BLD AUTO: 0.52 10*3/MM3 (ref 0–0.7)
EOSINOPHIL NFR BLD AUTO: 8.5 % (ref 0–4)
ERYTHROCYTE [DISTWIDTH] IN BLOOD BY AUTOMATED COUNT: 12.3 % (ref 12–15)
GFR SERPL CREATININE-BSD FRML MDRD: 88 ML/MIN/1.73
GLUCOSE BLD-MCNC: 84 MG/DL (ref 70–100)
HBA1C MFR BLD: 5 %
HCT VFR BLD AUTO: 37.5 % (ref 37–47)
HGB BLD-MCNC: 12.7 G/DL (ref 12–16)
IMM GRANULOCYTES # BLD: 0.04 10*3/MM3 (ref 0–0.03)
IMM GRANULOCYTES NFR BLD: 0.7 % (ref 0–5)
LIPASE SERPL-CCNC: 231 U/L (ref 23–203)
LYMPHOCYTES # BLD AUTO: 2.41 10*3/MM3 (ref 0.72–4.86)
LYMPHOCYTES NFR BLD AUTO: 39.4 % (ref 15–45)
MCH RBC QN AUTO: 31.2 PG (ref 28–32)
MCHC RBC AUTO-ENTMCNC: 33.9 G/DL (ref 33–36)
MCV RBC AUTO: 92.1 FL (ref 82–98)
MONOCYTES # BLD AUTO: 0.53 10*3/MM3 (ref 0.19–1.3)
MONOCYTES NFR BLD AUTO: 8.7 % (ref 4–12)
NEUTROPHILS # BLD AUTO: 2.55 10*3/MM3 (ref 1.87–8.4)
NEUTROPHILS NFR BLD AUTO: 41.6 % (ref 39–78)
NRBC BLD MANUAL-RTO: 0 /100 WBC (ref 0–0)
PLATELET # BLD AUTO: 232 10*3/MM3 (ref 130–400)
PMV BLD AUTO: 10.8 FL (ref 6–12)
POTASSIUM BLD-SCNC: 3.7 MMOL/L (ref 3.5–5.3)
RBC # BLD AUTO: 4.07 10*6/MM3 (ref 4.2–5.4)
SODIUM BLD-SCNC: 143 MMOL/L (ref 135–145)
WBC NRBC COR # BLD: 6.12 10*3/MM3 (ref 4.8–10.8)

## 2018-04-22 PROCEDURE — 83690 ASSAY OF LIPASE: CPT | Performed by: NURSE PRACTITIONER

## 2018-04-22 PROCEDURE — 83036 HEMOGLOBIN GLYCOSYLATED A1C: CPT | Performed by: NURSE PRACTITIONER

## 2018-04-22 PROCEDURE — 86140 C-REACTIVE PROTEIN: CPT | Performed by: NURSE PRACTITIONER

## 2018-04-22 PROCEDURE — 85025 COMPLETE CBC W/AUTO DIFF WBC: CPT | Performed by: NURSE PRACTITIONER

## 2018-04-22 PROCEDURE — 25010000002 ENOXAPARIN PER 10 MG: Performed by: NURSE PRACTITIONER

## 2018-04-22 PROCEDURE — 80048 BASIC METABOLIC PNL TOTAL CA: CPT | Performed by: NURSE PRACTITIONER

## 2018-04-22 RX ORDER — HYDROCODONE BITARTRATE AND ACETAMINOPHEN 7.5; 325 MG/1; MG/1
1 TABLET ORAL EVERY 4 HOURS PRN
Qty: 12 TABLET | Refills: 0 | Status: SHIPPED | OUTPATIENT
Start: 2018-04-22

## 2018-04-22 RX ORDER — FLUTICASONE PROPIONATE 50 MCG
2 SPRAY, SUSPENSION (ML) NASAL NIGHTLY
Qty: 15.8 ML | Refills: 0 | Status: SHIPPED | OUTPATIENT
Start: 2018-04-22

## 2018-04-22 RX ADMIN — HYDROCODONE BITARTRATE AND ACETAMINOPHEN 1 TABLET: 7.5; 325 TABLET ORAL at 07:44

## 2018-04-22 RX ADMIN — SODIUM CHLORIDE, POTASSIUM CHLORIDE, SODIUM LACTATE AND CALCIUM CHLORIDE 125 ML/HR: 600; 310; 30; 20 INJECTION, SOLUTION INTRAVENOUS at 01:26

## 2018-04-22 RX ADMIN — FAMOTIDINE 20 MG: 10 INJECTION, SOLUTION INTRAVENOUS at 08:15

## 2018-04-22 RX ADMIN — ENOXAPARIN SODIUM 40 MG: 40 INJECTION SUBCUTANEOUS at 08:15

## 2018-04-22 NOTE — PLAN OF CARE
Problem: Patient Care Overview  Goal: Plan of Care Review  Outcome: Ongoing (interventions implemented as appropriate)   04/22/18 0213   Coping/Psychosocial   Plan of Care Reviewed With patient   Plan of Care Review   Progress improving   OTHER   Outcome Summary Patient rested well through the night. c/o headache. NPO except for PO pain meds per MD. VSS, Safety maintained.       Problem: Pancreatitis, Acute/Chronic (Adult)  Goal: Signs and Symptoms of Listed Potential Problems Will be Absent, Minimized or Managed (Pancreatitis, Acute/Chronic)  Outcome: Ongoing (interventions implemented as appropriate)

## 2018-04-22 NOTE — PAYOR COMM NOTE
"ADMIT INPT 4-21-18  [id number]  UR PHONE    [phone]    Edna Orellana (52 y.o. Female)     Date of Birth Social Security Number Address Home Phone MRN    [id number]  [id number] [place] [address] [phone] [id number]    Yarsani Marital Status          None        Admission Date Admission Type Admitting Provider Attending Provider Department, Room/Bed    4/21/18 Emergency Juanpablo Gill MD  [facility] 3A, 353/1    Discharge Date Discharge Disposition Discharge Destination        4/22/2018 Home or Self Care              Attending Provider:  (none)   Allergies:  No Known Allergies    Isolation:  None   Infection:  None   Code Status:  Prior    Ht:  170.2 cm (67\")   Wt:  61.9 kg (136 lb 8 oz)    Admission Cmt:  None   Principal Problem:  None                Active Insurance as of 4/21/2018     Primary Coverage     Payor Plan Insurance Group Employer/Plan Group    [employer] [facility] [employer] [facility] [id number]     Payor Plan Address Payor Plan Phone Number Effective From Effective To    [address] [phone] 1/1/2018     [place] [address]       Subscriber Name Subscriber Birth Date Member ID       EDNA ORELLANA 1965 [id number]                 Emergency Contacts      (Rel.) Home Phone Work Phone Mobile Phone    Glenn Orellana (Spouse) [phone] -- --               History & Physical      Juanpablo Gill MD at 4/21/2018  8:17 AM              [facility] Medicine Services  HISTORY AND PHYSICAL    Date of Admission: 4/21/2018  Primary Care Physician: Oliverio Morrow MD    Subjective     Chief Complaint: epigastric pain     History of Present Illness  The patient is a 52 year old  female who presented to [facility] emergency department complaining of severe sudden epigastric pain.  She states that she had never had any pain like this before.  She denies alcohol use.  She " had a chicken breast and baked potato for supper last night.  She states that she lost 50 pounds last year on a low carb diet and has not been as faithful this year with her carbohydrate intake.  She denies any fatty foods on the night prior to admission.  She stated that she felt like she was having a heart attack and so they stopped at the EMS station in [facility] but decided to continue to come to the hospital via private car because the pain had let up some.  She did not have any acute EKG changes per ER and her troponin was negative.  She was found to have a lipase of 10,000.  She stated that she has been having fleeting nausea for the past week as well and just started to having vomiting early this morning with the severe pain.     Review of Systems   Constitutional: Positive for appetite change. Negative for activity change, chills and fever.   HENT: Negative for hearing loss, nosebleeds, tinnitus and trouble swallowing.    Eyes: Negative for visual disturbance.   Respiratory: Negative for cough, chest tightness, shortness of breath and wheezing.    Cardiovascular: Positive for chest pain. Negative for palpitations and leg swelling.   Gastrointestinal: Positive for abdominal pain, nausea and vomiting. Negative for abdominal distention, blood in stool, constipation and diarrhea.   Endocrine: Negative for cold intolerance, heat intolerance, polydipsia, polyphagia and polyuria.   Genitourinary: Negative for decreased urine volume, difficulty urinating, dysuria, flank pain, frequency and hematuria.   Musculoskeletal: Negative for arthralgias, joint swelling and myalgias.   Skin: Negative for rash.   Allergic/Immunologic: Negative for immunocompromised state.   Neurological: Positive for weakness. Negative for dizziness, syncope, light-headedness and headaches.   Hematological: Negative for adenopathy. Does not bruise/bleed easily.   Psychiatric/Behavioral: Negative for confusion and sleep disturbance. The patient  is not nervous/anxious.       Otherwise complete ROS reviewed and negative except as mentioned in the HPI.    Past Medical History:   Past Medical History:   Diagnosis Date   • Disease of thyroid gland    • Hyperlipidemia    • Hypertension    • Sebaceous hyperplasia    • Squamous cell carcinoma 08/08/2016    left cheek     Past Surgical History:  Past Surgical History:   Procedure Laterality Date   • CHOLECYSTECTOMY     • COLONOSCOPY N/A 1/12/2017    Procedure: COLONOSCOPY WITH ANESTHESIA;  Surgeon: Jose R Graves MD;  Location: [facility] ENDOSCOPY;  Service:    • EXCISION LESION  07/21/2016    sebacous hyperplasia   • HYSTERECTOMY     • SQUAMOUS CELL CARCINOMA EXCISION  08/08/2016    left cheek     Social History:  reports that she has never smoked. She does not have any smokeless tobacco history on file. She reports that she does not drink alcohol.    Family History: family history includes Hyperlipidemia in her other; Hypertension in her other.     Allergies:  No Known Allergies     Medications:  Prior to Admission medications    Medication Sig Start Date End Date Taking? Authorizing Provider   magnesium gluconate (MAGONATE) 500 MG tablet Take 27 mg by mouth 2 (Two) Times a Day.   Yes Historical Provider, MD   polyethylene glycol (MIRALAX) packet Take 17 g by mouth Daily.   Yes Historical Provider, MD   atorvastatin (LIPITOR) 40 MG tablet Take 40 mg by mouth Daily.    Historical Provider, MD   baclofen (LIORESAL) 10 MG tablet Take 10 mg by mouth 2 (Two) Times a Day.    Historical Provider, MD   Biotin (BIOTIN MAXIMUM STRENGTH) 10 MG tablet Take  by mouth Daily.    Historical Provider, MD   cetirizine (zyrTEC) 10 MG tablet Take 10 mg by mouth Daily.    Historical Provider, MD   lisinopril (PRINIVIL,ZESTRIL) 20 MG tablet Take 20 mg by mouth Daily.    Historical Provider, MD   omeprazole (priLOSEC) 20 MG capsule Take 20 mg by mouth 2 (Two) Times a Day.    Historical Provider, MD   oxybutynin (DITROPAN) 5 MG tablet  "Take 5 mg by mouth Daily.    Historical Provider, MD     Objective     Vital Signs: /78 (BP Location: Left arm, Patient Position: Lying)   Pulse 58   Temp 97.9 °F (36.6 °C) (Oral)   Resp 18   Ht 170.2 cm (67\")   Wt 61.9 kg (136 lb 8 oz)   SpO2 98%   BMI 21.38 kg/m²       Physical Exam   Constitutional: She is oriented to person, place, and time. She appears well-developed and well-nourished.   HENT:   Head: Normocephalic and atraumatic.   Eyes: Conjunctivae and EOM are normal. Pupils are equal, round, and reactive to light.   Neck: Neck supple. No JVD present. No thyromegaly present.   Cardiovascular: Normal rate, regular rhythm, normal heart sounds and intact distal pulses.  Exam reveals no gallop and no friction rub.    No murmur heard.  Pulmonary/Chest: Effort normal and breath sounds normal. No respiratory distress. She has no wheezes. She has no rales. She exhibits no tenderness.   Abdominal: Soft. Bowel sounds are normal. She exhibits no distension. There is no tenderness. There is no rebound and no guarding.   Musculoskeletal: Normal range of motion. She exhibits no edema, tenderness or deformity.   Lymphadenopathy:     She has no cervical adenopathy.   Neurological: She is alert and oriented to person, place, and time. She displays normal reflexes. No cranial nerve deficit. She exhibits normal muscle tone.   Skin: Skin is warm and dry. No rash noted.   Psychiatric: She has a normal mood and affect. Her behavior is normal. Judgment and thought content normal.   Nursing note and vitals reviewed.    Results Reviewed:  Lab Results (last 24 hours)     Procedure Component Value Units Date/Time    TSH [[id number]]  (Normal) Collected:  04/21/18 0644    Specimen:  Blood Updated:  04/21/18 0750     TSH 2.730 mIU/mL     Lipid Panel [[id number]]  (Abnormal) Collected:  04/21/18 0644    Specimen:  Blood Updated:  04/21/18 0731     Total Cholesterol 180 mg/dL      Triglycerides 35 mg/dL      HDL Cholesterol 49 " (L) mg/dL      LDL Cholesterol  108 (H) mg/dL      LDL/HDL Ratio 2.53    C-reactive Protein [[id number]]  (Normal) Collected:  04/21/18 0644    Specimen:  Blood Updated:  04/21/18 0723     C-Reactive Protein <0.50 mg/dL     Troponin [[id number]]  (Normal) Collected:  04/21/18 0308    Specimen:  Blood Updated:  04/21/18 0423     Troponin I <0.012 ng/mL     Lipase [[id number]]  (Abnormal) Collected:  04/21/18 0308    Specimen:  Blood Updated:  04/21/18 0350     Lipase 10,126 (H) U/L     Urinalysis With / Microscopic If Indicated - Urine, Clean Catch [[id number]]  (Normal) Collected:  04/21/18 0329    Specimen:  Urine from Urine, Clean Catch Updated:  04/21/18 0339     Color, UA Yellow     Appearance, UA Clear     pH, UA <=5.0     Specific Gravity, UA 1.012     Glucose, UA Negative     Ketones, UA Negative     Bilirubin, UA Negative     Blood, UA Negative     Protein, UA Negative     Leuk Esterase, UA Negative     Nitrite, UA Negative     Urobilinogen, UA 0.2 E.U./dL    Narrative:       Urine microscopic not indicated.    Comprehensive Metabolic Panel [[id number]]  (Abnormal) Collected:  04/21/18 0308    Specimen:  Blood Updated:  04/21/18 0327     Glucose 110 (H) mg/dL      BUN 19 mg/dL      Creatinine 0.73 mg/dL      Sodium 143 mmol/L      Potassium 3.8 mmol/L      Chloride 104 mmol/L      CO2 30.0 mmol/L      Calcium 9.2 mg/dL      Total Protein 7.3 g/dL      Albumin 4.20 g/dL      ALT (SGPT) 67 (H) U/L      AST (SGOT) 110 (H) U/L      Alkaline Phosphatase 96 U/L      Total Bilirubin 0.6 mg/dL      eGFR Non African Amer 84 mL/min/1.73      Globulin 3.1 gm/dL      A/G Ratio 1.4 g/dL      BUN/Creatinine Ratio 26.0 (H)     Anion Gap 9.0 mmol/L     CBC & Differential [[id number]] Collected:  04/21/18 0308    Specimen:  Blood Updated:  04/21/18 0316    Narrative:       The following orders were created for panel order CBC & Differential.  Procedure                               Abnormality         Status                      ---------                               -----------         ------                     CBC Auto Differential[[id number]]         Abnormal            Final result                 Please view results for these tests on the individual orders.    CBC Auto Differential [[id number]]  (Abnormal) Collected:  04/21/18 0308    Specimen:  Blood Updated:  04/21/18 0316     WBC 10.87 (H) 10*3/mm3      RBC 4.50 10*6/mm3      Hemoglobin 14.1 g/dL      Hematocrit 40.5 %      MCV 90.0 fL      MCH 31.3 pg      MCHC 34.8 g/dL      RDW 12.2 %      RDW-SD 40.2 fl      MPV 10.2 fL      Platelets 273 10*3/mm3      Neutrophil % 63.3 %      Lymphocyte % 21.5 %      Monocyte % 7.5 %      Eosinophil % 6.6 (H) %      Basophil % 0.7 %      Immature Grans % 0.4 %      Neutrophils, Absolute 6.88 10*3/mm3      Lymphocytes, Absolute 2.34 10*3/mm3      Monocytes, Absolute 0.81 10*3/mm3      Eosinophils, Absolute 0.72 (H) 10*3/mm3      Basophils, Absolute 0.08 10*3/mm3      Immature Grans, Absolute 0.04 (H) 10*3/mm3      nRBC 0.0 /100 WBC         Imaging Results (last 24 hours)     Procedure Component Value Units Date/Time    CT Abdomen Pelvis With Contrast [[id number]] Collected:  04/21/18 0800     Updated:  04/21/18 0805    Narrative:       CT ABDOMEN PELVIS W CONTRAST- 4/21/2018 4:12 AM CDT     HISTORY: Epigastric pain and elevated lipase       COMPARISON: None.      DOSE LENGTH PRODUCT: 291 mGy cm. Automated exposure control was also  utilized to decrease patient radiation dose.     TECHNIQUE: Following the intravenous administration of contrast, helical  CT tomographic images of the abdomen and pelvis were acquired.  Multiplanar reformatted images were provided for review.      FINDINGS:   The lung bases and base of the heart are unremarkable.      LIVER: No focal liver lesion. The hepatic vasculature is patent.      BILIARY SYSTEM: The gallbladder has been removed. There is no  intrahepatic or extrahepatic ductal dilation.      PANCREAS: No focal  pancreatic lesion.      SPLEEN: Unremarkable.      KIDNEYS AND ADRENALS: Bilateral kidneys and adrenal glands are  unremarkable. The ureters are decompressed and normal in appearance.     RETROPERITONEUM: No mass, lymphadenopathy or hemorrhage.      GI TRACT: No evidence of obstruction or bowel wall thickening.      OTHER: There is no mesenteric mass, lymphadenopathy or fluid collection.  The abdominopelvic vasculature is patent. The osseous structures and  soft tissues demonstrate no worrisome lesions.          PELVIS: No mass lesion, fluid collection or significant lymphadenopathy  is seen in the pelvis. The urinary bladder is normal in appearance.       Impression:       1. No evidence of acute abdominopelvic process. Specifically, no  evidence of acute pancreatitis.     A preliminary report was provided by [facility]. I  agree with the preliminary interpretation.        This report was finalized on 04/21/2018 08:02 by Dr. Michael Gillis MD.    XR Chest 1 View [[id number]] Updated:  04/21/18 0422        I have personally reviewed and interpreted the radiology studies and ECG obtained at time of admission.     Assessment / Plan     Assessment:   Hospital Problem List     Acute pancreatitis        1.  Acute idiopathic pancreatitis  2.  Nausea and vomiting  3.  Acute epigastric pain  4.  Essential hypertension  5.  Hyperlipidemia  6.  Hypothyroidism with adequate TSH    Plan:   1.  Admit to the hospitalist service  2.  NPO   3.  IVF with LR at 125 ml/hr  4.  Zofran 4 mg IV as needed  5.  Demerol 25 mg IV as needed  6.  CBC and BMP in AM  7.  Check Lipid profile, CRP  8.  Lipase in AM  9.  Check TSH   10.  Resume home medications as ordered  11.  Pepcid  12.  Work up ongoing     Code Status: Full     I discussed the patients findings and my recommendations with the patient,  and Dr. Gill.     Estimated length of stay 2 days.     Quentin Saravia, APRN   04/21/18   8:26 AM      I personally  evaluated and examined the patient in conjunction with MONTSE Lehman and agree with the assessment, treatment plan, and disposition of the patient as recorded by her. My history, exam, and further recommendations are: I have reviewed and agree with the plan. Kt.         Electronically signed by Juanpablo Gill MD at 4/21/2018  4:34 PM          Emergency Department Notes      Suni Guzman DO at 4/21/2018  5:55 AM              [facility]  eMERGENCY dEPARTMENT eNCOUnter      Pt Name: Lorrie Marmolejo  MRN: [id number]  Birthdate 1965  Date of evaluation: 4/21/2018      CHIEF COMPLAINT       Chief Complaint   Patient presents with   • Abdominal Pain       Nurses Notes reviewed and I agree except as noted in the HPI.      HISTORY OF PRESENT ILLNESS    Lorrie Marmolejo is a 52 y.o. female who presents     She presents for abdominal pain.  Patient states that she began to have abdominal pain yesterday but then it was not that bad.  Then she woke up in the middle the night and she had severe cramping abdominal pain in the epigastric area.  She had no treatment prior to arrival.  She states the pain goes right straight back to her back.  She is pale on arrival mildly diaphoretic.       REVIEW OF SYSTEMS     Review of Systems  CONSTITUTION: As per the HPI otherwise negative.    HENT: No congestion, no dental problems, no ear pain or discharge, , no nuchal rigidity, no meningeal signs.  EYES: No drainage, no itching, no photophobia, no visual disturbance  RESPIRATORY: No apnea, no chest tightness, no cough, no shortness of breath, no stridor, no wheezing  CARDIOVASCULAR: No chest pain, no palpitations, no leg swelling  GI: As per the HPI otherwise negative.  ENDOCRINE: No polydipsia, no polyphagia, no polyuria, no cold or heat intolerance  : No difficulty urinating, no dyspareunia, no dysuria, no flank pain, no frequency, no genital sore, no hematuria, no menstrual problem if female, no decreased  urination, no hesitation of urination, no vaginal discharge if female, no vaginal pain if female no penile pain if male  ALLERG/IMMUNO:  No env or food allergies, not immunocompromised  NEUROLOGICAL: No dizziness, no facial asymmetry, no Headaches, no Light-headedness, no numbness nor paresthesias, no seizures, no speech difficulty, No syncope, no tremors, no weakness  HEMATOLOGIC: No adenopathy, no unusual bleeding or bruising  MUSCULOSKELETAL: No arthralgia, no back pain, no joint swelling, no myalgias, no neck pain, no neck stiffness, Pulses 2/4 in all extremities.  SKIN: No rash, no color change, no pallor, no wound  PSYCH: No agitation, no behavior problem, no confusion, no decreased concentration, no hallucinations, no suicidal ideation, no homicidal ideation, no self injury, no sleep disturbance  All other systems negative.    PAST MEDICAL HISTORY     Past Medical History:   Diagnosis Date   • Disease of thyroid gland    • Hyperlipidemia    • Hypertension    • Sebaceous hyperplasia    • Squamous cell carcinoma 08/08/2016    left cheek       SURGICAL HISTORY      has a past surgical history that includes Cholecystectomy; Hysterectomy; Squamous cell carcinoma excision (08/08/2016); Excision Lesion (07/21/2016); and Colonoscopy (N/A, 1/12/2017).    CURRENT MEDICATIONS        Medication List      ASK your doctor about these medications    atorvastatin 40 MG tablet  Commonly known as:  LIPITOR     baclofen 10 MG tablet  Commonly known as:  LIORESAL     BIOTIN MAXIMUM STRENGTH 10 MG tablet  Generic drug:  Biotin     cetirizine 10 MG tablet  Commonly known as:  zyrTEC     lisinopril 20 MG tablet  Commonly known as:  PRINIVIL,ZESTRIL     magnesium gluconate 500 MG tablet  Commonly known as:  MAGONATE     omeprazole 20 MG capsule  Commonly known as:  priLOSEC     oxybutynin 5 MG tablet  Commonly known as:  DITROPAN     polyethylene glycol packet  Commonly known as:  MIRALAX          ALLERGIES     has No Known  "Allergies.    FAMILY HISTORY     indicated that the status of her neg hx is unknown.    family history is not on file.    SOCIAL HISTORY      reports that she has never smoked. She does not have any smokeless tobacco history on file. She reports that she does not drink alcohol.    PHYSICAL EXAM     INITIAL VITALS:  height is 170.2 cm (67\") and weight is 61.2 kg (135 lb). Her temperature is 98.2 °F (36.8 °C). Her blood pressure is 106/64 and her pulse is 60. Her respiration is 18 and oxygen saturation is 97%.    Physical Exam    CONSTITUTIONAL: Well developed, well nourished, not diaphoretic nor distressed  HENT: Normocephalic, atraumatic, oropharynx clear and moist  EYES: PERRL, EOM normal, no discharge, no scleral icterus  NECK: ROM normal, supple, no tracheal deviation nor JVD, no stridor  CARDIOVASCULAR: Normal rate and rhythm, heart sounds normal, no rub no gallop, intact distal pulses, normal cap refill  PULMONARY: Normal effort and breath sounds, no distress, no wheezes, rhonchi or rales, no chest tenderness  ABDOMINAL: Soft, Mandeep epigastric area., no guarding, no mass, no rebound, no hernia  GENITOURINARY/ANORECTAL: deferred  MUSCULOSKELETAL: ROM normal, no tenderness nor deformity, no edema  NEUROLOGICAL: Alert, oriented x 3, DTRs normal, CN x 10 normal, normal tone  SKIN: Warm, dry, no erythema, no rash, normal color  PSYCH: Mood and affect normal, behavior normal, thought content and judgement normal.      DIFFERENTIAL DIAGNOSIS:       DIAGNOSTIC RESULTS     EKG: All EKG's are interpreted by the Emergency Department Physician who either signs or Co-signs this chart in the absence of a cardiologist.      RADIOLOGY: non-plain film images(s) such as CT, Ultrasound and MRI are read by the radiologist.  Plain radiographic images are visualized and preliminarily interpreted by the emergency physician unless otherwise stated below.  No results found.           LABS:   Lab Results (last 24 hours)     Procedure " Component Value Units Date/Time    CBC & Differential [[id number]] Collected:  04/21/18 0308    Specimen:  Blood Updated:  04/21/18 0316    Narrative:       The following orders were created for panel order CBC & Differential.  Procedure                               Abnormality         Status                     ---------                               -----------         ------                     CBC Auto Differential[[id number]]         Abnormal            Final result                 Please view results for these tests on the individual orders.    Comprehensive Metabolic Panel [[id number]]  (Abnormal) Collected:  04/21/18 0308    Specimen:  Blood Updated:  04/21/18 0327     Glucose 110 (H) mg/dL      BUN 19 mg/dL      Creatinine 0.73 mg/dL      Sodium 143 mmol/L      Potassium 3.8 mmol/L      Chloride 104 mmol/L      CO2 30.0 mmol/L      Calcium 9.2 mg/dL      Total Protein 7.3 g/dL      Albumin 4.20 g/dL      ALT (SGPT) 67 (H) U/L      AST (SGOT) 110 (H) U/L      Alkaline Phosphatase 96 U/L      Total Bilirubin 0.6 mg/dL      eGFR Non African Amer 84 mL/min/1.73      Globulin 3.1 gm/dL      A/G Ratio 1.4 g/dL      BUN/Creatinine Ratio 26.0 (H)     Anion Gap 9.0 mmol/L     Lipase [[id number]]  (Abnormal) Collected:  04/21/18 0308    Specimen:  Blood Updated:  04/21/18 0350     Lipase 10,126 (H) U/L     CBC Auto Differential [[id number]]  (Abnormal) Collected:  04/21/18 0308    Specimen:  Blood Updated:  04/21/18 0316     WBC 10.87 (H) 10*3/mm3      RBC 4.50 10*6/mm3      Hemoglobin 14.1 g/dL      Hematocrit 40.5 %      MCV 90.0 fL      MCH 31.3 pg      MCHC 34.8 g/dL      RDW 12.2 %      RDW-SD 40.2 fl      MPV 10.2 fL      Platelets 273 10*3/mm3      Neutrophil % 63.3 %      Lymphocyte % 21.5 %      Monocyte % 7.5 %      Eosinophil % 6.6 (H) %      Basophil % 0.7 %      Immature Grans % 0.4 %      Neutrophils, Absolute 6.88 10*3/mm3      Lymphocytes, Absolute 2.34 10*3/mm3      Monocytes, Absolute 0.81 10*3/mm3       Eosinophils, Absolute 0.72 (H) 10*3/mm3      Basophils, Absolute 0.08 10*3/mm3      Immature Grans, Absolute 0.04 (H) 10*3/mm3      nRBC 0.0 /100 WBC     Troponin [[id number]]  (Normal) Collected:  04/21/18 0308    Specimen:  Blood Updated:  04/21/18 0423     Troponin I <0.012 ng/mL     Urinalysis With / Microscopic If Indicated - Urine, Clean Catch [[id number]]  (Normal) Collected:  04/21/18 0329    Specimen:  Urine from Urine, Clean Catch Updated:  04/21/18 0339     Color, UA Yellow     Appearance, UA Clear     pH, UA <=5.0     Specific Gravity, UA 1.012     Glucose, UA Negative     Ketones, UA Negative     Bilirubin, UA Negative     Blood, UA Negative     Protein, UA Negative     Leuk Esterase, UA Negative     Nitrite, UA Negative     Urobilinogen, UA 0.2 E.U./dL    Narrative:       Urine microscopic not indicated.          EMERGENCY DEPARTMENT COURSE:   Vitals:    Vitals:    04/21/18 0446 04/21/18 0501 04/21/18 0516 04/21/18 0532   BP: 120/71 113/70 105/69 106/64   Pulse: 75 67 63 60   Resp:       Temp:       SpO2: 97% 96% 97% 97%   Weight:       Height:           The patient was given the following medications:  Medications   sodium chloride 0.9 % infusion (150 mL/hr Intravenous New Bag 4/21/18 0400)   aspirin tablet 325 mg (325 mg Oral Given 4/21/18 0341)   meperidine (DEMEROL) injection 25 mg (25 mg Intravenous Given 4/21/18 0404)   ondansetron (ZOFRAN) injection 4 mg (4 mg Intravenous Given 4/21/18 0403)   iopamidol (ISOVUE-300) 61 % injection 100 mL (100 mL Intravenous Given 4/21/18 1981)       ED Course       CRITICAL CARE:  none    CONSULTS:  none    PROCEDURES:  None    FINAL IMPRESSION      1. Acute pancreatitis, unspecified complication status, unspecified pancreatitis type          DISPOSITION/PLAN   Patient admitted to the service of Dr. Vera in improved condition.  She is pain-free at this time.      PATIENT REFERRED TO:  No follow-up provider specified.    DISCHARGE MEDICATIONS:     Medication List       ASK your doctor about these medications    atorvastatin 40 MG tablet  Commonly known as:  LIPITOR     baclofen 10 MG tablet  Commonly known as:  LIORESAL     BIOTIN MAXIMUM STRENGTH 10 MG tablet  Generic drug:  Biotin     cetirizine 10 MG tablet  Commonly known as:  zyrTEC     lisinopril 20 MG tablet  Commonly known as:  PRINIVIL,ZESTRIL     magnesium gluconate 500 MG tablet  Commonly known as:  MAGONATE     omeprazole 20 MG capsule  Commonly known as:  priLOSEC     oxybutynin 5 MG tablet  Commonly known as:  DITROPAN     polyethylene glycol packet  Commonly known as:  MIRALAX          (Please note that portions of this note were completed with a voice recognition program.)    DO Suni Silva DO  04/21/18 0558      Electronically signed by Suni Guzman DO at 4/21/2018  5:58 AM             ICU Vital Signs     Row Name 04/22/18 1125 04/22/18 0814 04/22/18 0757 04/22/18 0415 04/22/18 0007       Vitals    Temp 98.3 °F (36.8 °C)  -- 97.7 °F (36.5 °C) 97.2 °F (36.2 °C) 97.9 °F (36.6 °C)    Temp src Oral  -- Oral Oral Oral    Pulse 75  -- 61 57 59    Heart Rate Source Monitor  -- Monitor Monitor Monitor    Resp 18 -- 18 18 18    Resp Rate Source Visual  -- Visual Visual Visual    /62  -- 133/68 131/76 104/70    BP Location Left arm  -- Left arm Left arm Left arm    BP Method Automatic  -- Automatic Automatic Automatic    Patient Position Lying  -- Lying Lying Lying       Oxygen Therapy    SpO2 97 %  -- 100 % 97 % 95 %    Pulse Oximetry Type Intermittent  -- Intermittent Intermittent  --    Device (Oxygen Therapy) room air room air room air room air room air    Row Name 04/21/18 2016 04/21/18 2015 04/21/18 1601 04/21/18 1152 04/21/18 0845       Vitals    Temp 98.2 °F (36.8 °C)  -- 97.9 °F (36.6 °C) 97.9 °F (36.6 °C)  --    Temp src Oral  -- Oral Oral  --    Pulse 57  -- 59 65  --    Heart Rate Source Monitor  -- Monitor Monitor  --    Resp 18  -- 18 18  --    Resp  "Rate Source Visual  -- Visual Visual  --    /74  -- 121/77 124/74  --    BP Location Left arm  -- Left arm Left arm  --    BP Method Automatic  -- Automatic Automatic  --    Patient Position Lying  -- Lying Lying  --       Oxygen Therapy    SpO2 96 %  -- 99 % 97 %  --    Pulse Oximetry Type Intermittent  -- Intermittent Intermittent  --    Device (Oxygen Therapy) room air room air room air room air room air    Row Name 04/21/18 0732 04/21/18 0731 04/21/18 0639 04/21/18 0601 04/21/18 0547       Height and Weight    Height  --  -- 170.2 cm (67\")  --  --    Height Method  --  -- Actual  --  --    Weight  --  -- 61.9 kg (136 lb 8 oz)  --  --    Ideal Body Weight (IBW) (kg)  --  -- 61.86  --  --    BSA (Calculated - sq m)  --  -- 1.72 sq meters  --  --    BMI (Calculated)  --  -- 21.4  --  --    Weight in (lb) to have BMI = 25  --  -- 159.3  --  --       Vitals    Temp 97.9 °F (36.6 °C)  -- 97.7 °F (36.5 °C)  --  --    Temp src Oral  -- Oral  --  --    Pulse 58  -- 57 63 57    Heart Rate Source Monitor  -- Monitor  --  --    Resp 18  --  --  --  --    Resp Rate Source Visual  --  --  --  --    /78  -- 128/83 120/79 106/74    Noninvasive MAP (mmHg)  --  --  -- 91 85    BP Location Left arm  -- Left arm  --  --    BP Method Automatic  -- Automatic  --  --    Patient Position Lying  --  --  --  --       Oxygen Therapy    SpO2 98 % 98 % 98 % 97 % 96 %    Pulse Oximetry Type Intermittent Intermittent  --  --  --    Device (Oxygen Therapy) room air room air room air  --  --    Row Name 04/21/18 0532 04/21/18 0516 04/21/18 0501 04/21/18 0446 04/21/18 0431       Vitals    Pulse 60 63 67 75 79    /64 105/69 113/70 120/71 118/67    Noninvasive MAP (mmHg) 77 81 84 86 84       Oxygen Therapy    SpO2 97 % 97 % 96 % 97 % 97 %    Row Name 04/21/18 0428 04/21/18 0402 04/21/18 0347 04/21/18 0332 04/21/18 0302       Vitals    Pulse 76 78 66 66 73    /64 127/85 125/79 130/63 145/77    Noninvasive MAP (mmHg) 85 98 94 " "84 97       Oxygen Therapy    SpO2 98 % 100 % 100 % 99 % 100 %    Row Name 04/21/18 0300 04/21/18 0239                Height and Weight    Height  -- 170.2 cm (67\")       Height Method  -- Stated       Weight  -- 61.2 kg (135 lb)       Weight Method  -- Stated       Ideal Body Weight (IBW) (kg)  -- 61.86       BSA (Calculated - sq m)  -- 1.71 sq meters       BMI (Calculated)  -- 21.1       Weight in (lb) to have BMI = 25  -- 159.3          Vitals    Temp  -- 98.2 °F (36.8 °C)       Pulse 66 77       Resp  -- 18       /94 139/69       Noninvasive MAP (mmHg) 106  --          Oxygen Therapy    SpO2 96 % 100 %           Intake & Output (last 3 days)       04/19 0701 - 04/20 0700 04/20 0701 - 04/21 0700 04/21 0701 - 04/22 0700 04/22 0701 - 04/23 0700    I.V. (mL/kg)   3455 (55.8)     Total Intake(mL/kg)   3455 (55.8)     Net     +3455              Unmeasured Urine Occurrence   3 x         Lines, Drains & Airways    Active LDAs     None         Inactive LDAs     Name:   Placement date:   Placement time:   Removal date:   Removal time:   Site:   Days:    [REMOVED] Peripheral IV 04/21/18 0300 Right Antecubital  04/21/18    0300    04/22/18    1437    Antecubital    1                Hospital Medications (all)       Dose Frequency Start End    aspirin tablet 325 mg 325 mg Once 4/21/2018 4/21/2018    Sig - Route: Take 1 tablet by mouth 1 (One) Time. - Oral    Cosign for Ordering: Accepted by Suni Guzman DO on 4/21/2018  6:42 AM    iopamidol (ISOVUE-300) 61 % injection 100 mL 100 mL Once in Imaging 4/21/2018 4/21/2018    Sig - Route: Infuse 100 mL into a venous catheter Once. - Intravenous    meperidine (DEMEROL) injection 25 mg 25 mg Once 4/21/2018 4/21/2018    Sig - Route: Infuse 1 mL into a venous catheter 1 (One) Time. - Intravenous    Cosign for Ordering: Accepted by Suni Guzman DO on 4/21/2018  6:42 AM    ondansetron (ZOFRAN) injection 4 mg 4 mg Once 4/21/2018 4/21/2018    Sig - Route: Infuse 2 mL into " a venous catheter 1 (One) Time. - Intravenous    Cosign for Ordering: Accepted by Suni Guzman DO on 4/21/2018  6:42 AM    acetaminophen (TYLENOL) tablet 650 mg (Discontinued) 650 mg Every 4 Hours PRN 4/21/2018 4/22/2018    Sig - Route: Take 2 tablets by mouth Every 4 (Four) Hours As Needed for Mild Pain . - Oral    Reason for Discontinue: Patient Discharge    enoxaparin (LOVENOX) syringe 40 mg (Discontinued) 40 mg Every 24 Hours 4/21/2018 4/22/2018    Sig - Route: Inject 0.4 mL under the skin Daily. - Subcutaneous    Reason for Discontinue: Patient Discharge    famotidine (PEPCID) injection 20 mg (Discontinued) 20 mg Every 12 Hours Scheduled 4/21/2018 4/22/2018    Sig - Route: Infuse 2 mL into a venous catheter Every 12 (Twelve) Hours. - Intravenous    Reason for Discontinue: Patient Discharge    fluticasone (FLONASE) 50 MCG/ACT nasal spray 2 spray (Discontinued) 2 spray Nightly 4/21/2018 4/22/2018    Sig - Route: 2 sprays by Each Nare route Every Night. - Each Nare    Reason for Discontinue: Patient Discharge    hydrALAZINE (APRESOLINE) injection 10 mg (Discontinued) 10 mg Every 6 Hours PRN 4/21/2018 4/22/2018    Sig - Route: Infuse 0.5 mL into a venous catheter Every 6 (Six) Hours As Needed for High Blood Pressure. - Intravenous    Reason for Discontinue: Patient Discharge    HYDROcodone-acetaminophen (NORCO) 7.5-325 MG per tablet 1 tablet (Discontinued) 1 tablet Every 4 Hours PRN 4/21/2018 4/22/2018    Sig - Route: Take 1 tablet by mouth Every 4 (Four) Hours As Needed for Moderate Pain . - Oral    Reason for Discontinue: Patient Discharge    lactated ringers infusion (Discontinued) 100 mL/hr Continuous 4/21/2018 4/22/2018    Sig - Route: Infuse 100 mL/hr into a venous catheter Continuous. - Intravenous    meperidine (DEMEROL) injection 25 mg (Discontinued) 25 mg Every 3 Hours PRN 4/21/2018 4/21/2018    Sig - Route: Infuse 1 mL into a venous catheter Every 3 (Three) Hours As Needed for Severe Pain . -  Intravenous    Reason for Discontinue: Availability    ondansetron (ZOFRAN) injection 4 mg (Discontinued) 4 mg Every 6 Hours PRN 4/21/2018 4/22/2018    Sig - Route: Infuse 2 mL into a venous catheter Every 6 (Six) Hours As Needed for Nausea or Vomiting. - Intravenous    Reason for Discontinue: Patient Discharge    sodium chloride 0.9 % flush 1-10 mL (Discontinued) 1-10 mL As Needed 4/21/2018 4/22/2018    Sig - Route: Infuse 1-10 mL into a venous catheter As Needed for Line Care. - Intravenous    Reason for Discontinue: Patient Discharge    sodium chloride 0.9 % infusion (Discontinued) 150 mL/hr Continuous 4/21/2018 4/21/2018    Sig - Route: Infuse 150 mL/hr into a venous catheter Continuous. - Intravenous    Cosign for Ordering: Accepted by Suni Guzman DO on 4/21/2018  6:42 AM          Lab Results (all)     Procedure Component Value Units Date/Time    Hemoglobin A1c [[id number]] Collected:  04/22/18 0540    Specimen:  Blood Updated:  04/22/18 0805     Hemoglobin A1C 5.0 %     Narrative:       Less than 6.0           Non-Diabetic Range  6.0-7.0                 ADA Therapeutic Target  Greater than 7.0        Action Suggested    CBC & Differential [[id number]] Collected:  04/22/18 0540    Specimen:  Blood Updated:  04/22/18 0654    Narrative:       The following orders were created for panel order CBC & Differential.  Procedure                               Abnormality         Status                     ---------                               -----------         ------                     CBC Auto Differential[[id number]]        Abnormal            Final result                 Please view results for these tests on the individual orders.    CBC Auto Differential [[id number]]  (Abnormal) Collected:  04/22/18 0540    Specimen:  Blood Updated:  04/22/18 0654     WBC 6.12 10*3/mm3      RBC 4.07 (L) 10*6/mm3      Hemoglobin 12.7 g/dL      Hematocrit 37.5 %      MCV 92.1 fL      MCH 31.2 pg      MCHC 33.9 g/dL      RDW  12.3 %      RDW-SD 41.6 fl      MPV 10.8 fL      Platelets 232 10*3/mm3      Neutrophil % 41.6 %      Lymphocyte % 39.4 %      Monocyte % 8.7 %      Eosinophil % 8.5 (H) %      Basophil % 1.1 %      Immature Grans % 0.7 %      Neutrophils, Absolute 2.55 10*3/mm3      Lymphocytes, Absolute 2.41 10*3/mm3      Monocytes, Absolute 0.53 10*3/mm3      Eosinophils, Absolute 0.52 10*3/mm3      Basophils, Absolute 0.07 10*3/mm3      Immature Grans, Absolute 0.04 (H) 10*3/mm3      nRBC 0.0 /100 WBC     Basic Metabolic Panel [[id number]]  (Normal) Collected:  04/22/18 0540    Specimen:  Blood Updated:  04/22/18 0638     Glucose 84 mg/dL      BUN 9 mg/dL      Creatinine 0.70 mg/dL      Sodium 143 mmol/L      Potassium 3.7 mmol/L      Chloride 105 mmol/L      CO2 30.0 mmol/L      Calcium 8.7 mg/dL      eGFR Non African Amer 88 mL/min/1.73      BUN/Creatinine Ratio 12.9     Anion Gap 8.0 mmol/L     Narrative:       GFR Normal >60  Chronic Kidney Disease <60  Kidney Failure <15    Lipase [[id number]]  (Abnormal) Collected:  04/22/18 0540    Specimen:  Blood Updated:  04/22/18 0620     Lipase 231 (H) U/L     C-reactive Protein [[id number]]  (Normal) Collected:  04/22/18 0540    Specimen:  Blood Updated:  04/22/18 0620     C-Reactive Protein <0.50 mg/dL     TSH [[id number]]  (Normal) Collected:  04/21/18 0644    Specimen:  Blood Updated:  04/21/18 0750     TSH 2.730 mIU/mL     Lipid Panel [[id number]]  (Abnormal) Collected:  04/21/18 0644    Specimen:  Blood Updated:  04/21/18 0731     Total Cholesterol 180 mg/dL      Triglycerides 35 mg/dL      HDL Cholesterol 49 (L) mg/dL      LDL Cholesterol  108 (H) mg/dL      LDL/HDL Ratio 2.53    C-reactive Protein [[id number]]  (Normal) Collected:  04/21/18 0644    Specimen:  Blood Updated:  04/21/18 0723     C-Reactive Protein <0.50 mg/dL     Troponin [[id number]]  (Normal) Collected:  04/21/18 0308    Specimen:  Blood Updated:  04/21/18 0423     Troponin I <0.012 ng/mL     Lipase [[id number]]   (Abnormal) Collected:  04/21/18 0308    Specimen:  Blood Updated:  04/21/18 0350     Lipase 10,126 (H) U/L     Urinalysis With / Microscopic If Indicated - Urine, Clean Catch [[id number]]  (Normal) Collected:  04/21/18 0329    Specimen:  Urine from Urine, Clean Catch Updated:  04/21/18 0339     Color, UA Yellow     Appearance, UA Clear     pH, UA <=5.0     Specific Gravity, UA 1.012     Glucose, UA Negative     Ketones, UA Negative     Bilirubin, UA Negative     Blood, UA Negative     Protein, UA Negative     Leuk Esterase, UA Negative     Nitrite, UA Negative     Urobilinogen, UA 0.2 E.U./dL    Narrative:       Urine microscopic not indicated.    Comprehensive Metabolic Panel [[id number]]  (Abnormal) Collected:  04/21/18 0308    Specimen:  Blood Updated:  04/21/18 0327     Glucose 110 (H) mg/dL      BUN 19 mg/dL      Creatinine 0.73 mg/dL      Sodium 143 mmol/L      Potassium 3.8 mmol/L      Chloride 104 mmol/L      CO2 30.0 mmol/L      Calcium 9.2 mg/dL      Total Protein 7.3 g/dL      Albumin 4.20 g/dL      ALT (SGPT) 67 (H) U/L      AST (SGOT) 110 (H) U/L      Alkaline Phosphatase 96 U/L      Total Bilirubin 0.6 mg/dL      eGFR Non African Amer 84 mL/min/1.73      Globulin 3.1 gm/dL      A/G Ratio 1.4 g/dL      BUN/Creatinine Ratio 26.0 (H)     Anion Gap 9.0 mmol/L     CBC & Differential [[id number]] Collected:  04/21/18 0308    Specimen:  Blood Updated:  04/21/18 0316    Narrative:       The following orders were created for panel order CBC & Differential.  Procedure                               Abnormality         Status                     ---------                               -----------         ------                     CBC Auto Differential[[id number]]         Abnormal            Final result                 Please view results for these tests on the individual orders.    CBC Auto Differential [[id number]]  (Abnormal) Collected:  04/21/18 0308    Specimen:  Blood Updated:  04/21/18 0316     WBC 10.87 (H)  10*3/mm3      RBC 4.50 10*6/mm3      Hemoglobin 14.1 g/dL      Hematocrit 40.5 %      MCV 90.0 fL      MCH 31.3 pg      MCHC 34.8 g/dL      RDW 12.2 %      RDW-SD 40.2 fl      MPV 10.2 fL      Platelets 273 10*3/mm3      Neutrophil % 63.3 %      Lymphocyte % 21.5 %      Monocyte % 7.5 %      Eosinophil % 6.6 (H) %      Basophil % 0.7 %      Immature Grans % 0.4 %      Neutrophils, Absolute 6.88 10*3/mm3      Lymphocytes, Absolute 2.34 10*3/mm3      Monocytes, Absolute 0.81 10*3/mm3      Eosinophils, Absolute 0.72 (H) 10*3/mm3      Basophils, Absolute 0.08 10*3/mm3      Immature Grans, Absolute 0.04 (H) 10*3/mm3      nRBC 0.0 /100 WBC           Imaging Results (all)     Procedure Component Value Units Date/Time    XR Chest 1 View [[id number]] Collected:  04/21/18 0858     Updated:  04/21/18 0901    Narrative:       XR CHEST 1 VW- 4/21/2018 3:15 AM CDT     HISTORY: Chest pain       COMPARISON: 8/1/2016.     FINDINGS:   The lungs are clear. The cardiomediastinal silhouette and pulmonary  vascularity are unchanged.      The osseous structures and surrounding soft tissues demonstrate no acute  abnormality.       Impression:       1. No radiographic evidence of acute cardiopulmonary process.        This report was finalized on 04/21/2018 08:58 by Dr. Michael Gillis MD.    CT Abdomen Pelvis With Contrast [[id number]] Collected:  04/21/18 0800     Updated:  04/21/18 0805    Narrative:       CT ABDOMEN PELVIS W CONTRAST- 4/21/2018 4:12 AM CDT     HISTORY: Epigastric pain and elevated lipase       COMPARISON: None.      DOSE LENGTH PRODUCT: 291 mGy cm. Automated exposure control was also  utilized to decrease patient radiation dose.     TECHNIQUE: Following the intravenous administration of contrast, helical  CT tomographic images of the abdomen and pelvis were acquired.  Multiplanar reformatted images were provided for review.      FINDINGS:   The lung bases and base of the heart are unremarkable.      LIVER: No focal liver  lesion. The hepatic vasculature is patent.      BILIARY SYSTEM: The gallbladder has been removed. There is no  intrahepatic or extrahepatic ductal dilation.      PANCREAS: No focal pancreatic lesion.      SPLEEN: Unremarkable.      KIDNEYS AND ADRENALS: Bilateral kidneys and adrenal glands are  unremarkable. The ureters are decompressed and normal in appearance.     RETROPERITONEUM: No mass, lymphadenopathy or hemorrhage.      GI TRACT: No evidence of obstruction or bowel wall thickening.      OTHER: There is no mesenteric mass, lymphadenopathy or fluid collection.  The abdominopelvic vasculature is patent. The osseous structures and  soft tissues demonstrate no worrisome lesions.          PELVIS: No mass lesion, fluid collection or significant lymphadenopathy  is seen in the pelvis. The urinary bladder is normal in appearance.       Impression:       1. No evidence of acute abdominopelvic process. Specifically, no  evidence of acute pancreatitis.     A preliminary report was provided by [facility]. I  agree with the preliminary interpretation.        This report was finalized on 04/21/2018 08:02 by Dr. Michael Gillis MD.          Orders (all)     Start     Ordered    04/22/18 1221  Discharge patient  Once      04/22/18 1221    04/22/18 1221  Discontinue IV  Once,   Status:  Canceled      04/22/18 1221    04/22/18 1133  Diet Full Liquid  Diet Effective Now,   Status:  Canceled      04/22/18 1132    04/22/18 1133  Advance Diet As Tolerated  Until Discontinued,   Status:  Canceled      04/22/18 1132    04/22/18 0828  Inpatient Advance Care Planning Consult  Once,   Status:  Canceled     Comments:  NEW ADMIT   Provider:  (Not yet assigned)    04/22/18 0833    04/22/18 0623  Basic Metabolic Panel  STAT      04/22/18 0622    04/22/18 0622  Diet Clear Liquid  Diet Effective Now,   Status:  Canceled      04/22/18 0621    04/22/18 0600  CBC & Differential  Daily,   Status:  Canceled      04/21/18 0624     04/22/18 0600  Hemoglobin A1c  Morning Draw      04/21/18 0624    04/22/18 0600  Lipase  Morning Draw      04/21/18 0624    04/22/18 0600  C-reactive Protein  Morning Draw      04/21/18 0624    04/22/18 0600  CBC Auto Differential  PROCEDURE ONCE      04/22/18 0001    04/22/18 0000  fluticasone (FLONASE) 50 MCG/ACT nasal spray  Nightly      04/22/18 1221    04/22/18 0000  Discharge Follow-up with PCP      04/22/18 1221    04/22/18 0000  Activity as Tolerated      04/22/18 1221    04/22/18 0000  Diet: Regular, Specialty Diet; Thin Liquids, No Restrictions; Low Fat; Thin      04/22/18 1221    04/22/18 0000  HYDROcodone-acetaminophen (NORCO) 7.5-325 MG per tablet  Every 4 Hours PRN      04/22/18 1222    04/21/18 2150  NPO Diet NPO Except: Other; Other: PO Pain meds  Diet Effective Now,   Status:  Canceled      04/21/18 2150    04/21/18 2100  fluticasone (FLONASE) 50 MCG/ACT nasal spray 2 spray  Nightly,   Status:  Discontinued      04/21/18 1436    04/21/18 0911  Inpatient Advance Care Planning Consult  Once,   Status:  Canceled     Comments:  NEW ADMIT   Provider:  (Not yet assigned)    04/21/18 0910    04/21/18 0900  famotidine (PEPCID) injection 20 mg  Every 12 Hours Scheduled,   Status:  Discontinued      04/21/18 0816    04/21/18 0816  hydrALAZINE (APRESOLINE) injection 10 mg  Every 6 Hours PRN,   Status:  Discontinued      04/21/18 0816    04/21/18 0800  Vital Signs  Every 4 Hours,   Status:  Canceled      04/21/18 0624    04/21/18 0700  Strict Intake and Output  Every Hour,   Status:  Canceled      04/21/18 0624    04/21/18 0700  enoxaparin (LOVENOX) syringe 40 mg  Every 24 Hours,   Status:  Discontinued      04/21/18 0624    04/21/18 0700  lactated ringers infusion  Continuous,   Status:  Discontinued      04/21/18 0624    04/21/18 0625  Weigh Patient  Once,   Status:  Canceled      04/21/18 0624 04/21/18 0625  Insert Peripheral IV  Once,   Status:  Canceled      04/21/18 0624 04/21/18 0625  Saline Lock &  Maintain IV Access  Continuous,   Status:  Canceled      04/21/18 0624    04/21/18 0625  Full Code  Continuous,   Status:  Canceled      04/21/18 0624    04/21/18 0625  VTE Risk Assessment - Moderate Risk  Once      04/21/18 0624    04/21/18 0625  Place Sequential Compression Device  Once,   Status:  Canceled      04/21/18 0624    04/21/18 0625  Maintain Sequential Compression Device  Continuous,   Status:  Canceled      04/21/18 0624    04/21/18 0625  Pulse Oximetry,  Spot  Once      04/21/18 0624    04/21/18 0625  Cardiac Monitoring  Continuous,   Status:  Canceled      04/21/18 0624    04/21/18 0625  NPO Diet  Diet Effective Now,   Status:  Canceled      04/21/18 0624    04/21/18 0625  Lipid Panel  Once      04/21/18 0624    04/21/18 0625  TSH  Once      04/21/18 0624    04/21/18 0625  C-reactive Protein  Once      04/21/18 0624    04/21/18 0624  sodium chloride 0.9 % flush 1-10 mL  As Needed,   Status:  Discontinued      04/21/18 0624    04/21/18 0624  Up With Assistance  As Needed,   Status:  Canceled      04/21/18 0624    04/21/18 0624  acetaminophen (TYLENOL) tablet 650 mg  Every 4 Hours PRN,   Status:  Discontinued      04/21/18 0624    04/21/18 0624  HYDROcodone-acetaminophen (NORCO) 7.5-325 MG per tablet 1 tablet  Every 4 Hours PRN,   Status:  Discontinued      04/21/18 0624    04/21/18 0624  ondansetron (ZOFRAN) injection 4 mg  Every 6 Hours PRN,   Status:  Discontinued      04/21/18 0624    04/21/18 0624  meperidine (DEMEROL) injection 25 mg  Every 3 Hours PRN,   Status:  Discontinued      04/21/18 0624    04/21/18 0555  Inpatient Admission  Once      04/21/18 0555    04/21/18 0429  iopamidol (ISOVUE-300) 61 % injection 100 mL  Once in Imaging      04/21/18 0427    04/21/18 0401  sodium chloride 0.9 % infusion  Continuous,   Status:  Discontinued      04/21/18 0359    04/21/18 0401  meperidine (DEMEROL) injection 25 mg  Once      04/21/18 0359    04/21/18 0401  ondansetron (ZOFRAN) injection 4 mg  Once       04/21/18 0359    04/21/18 0340  aspirin tablet 325 mg  Once      04/21/18 0338    04/21/18 0336  Troponin  Once      04/21/18 0335    04/21/18 0307  CBC & Differential  Once      04/21/18 0306    04/21/18 0307  Comprehensive Metabolic Panel  Once      04/21/18 0306    04/21/18 0307  Lipase  Once      04/21/18 0306    04/21/18 0307  Urinalysis With / Microscopic If Indicated - Urine, Clean Catch  Once      04/21/18 0306    04/21/18 0307  XR Chest 1 View  1 Time Imaging      04/21/18 0306    04/21/18 0307  CBC Auto Differential  PROCEDURE ONCE      04/21/18 0306    04/21/18 0307  CT Abdomen Pelvis With Contrast  1 Time Imaging     Comments:  IV CONTRAST ONLY      04/21/18 0307    04/21/18 0241  ECG 12 Lead  Once      04/21/18 0240    --  polyethylene glycol (MIRALAX) packet  Daily      04/21/18 0315    --  magnesium gluconate (MAGONATE) 500 MG tablet  Daily      04/21/18 0315    --  SCANNED - TELEMETRY        04/21/18 0000    --  SCANNED EKG      04/21/18 0000          Physician Progress Notes (all)     No notes of this type exist for this encounter.        Consult Notes (all)     No notes of this type exist for this encounter.           Discharge Summary      Juanpablo Gill MD at 4/22/2018 12:22 PM              [facility] Medicine Services  DISCHARGE SUMMARY       Date of Admission: 4/21/2018  Date of Discharge:  4/22/2018  Primary Care Physician: Oliverio Morrow MD    Presenting Problem/History of Present Illness:  Acute pancreatitis, unspecified complication status, unspecified pancreatitis type [K85.90]     Final Discharge Diagnoses:  Hospital Problem List     Acute pancreatitis        1.  Acute idiopathic pancreatitis  2.  Nausea and vomiting  3.  Acute epigastric pain  4.  Essential hypertension  5.  Hyperlipidemia  6.  Hypothyroidism with adequate TSH    Consults: None    Procedures Performed: None    Pertinent Test Results:   Lab Results (last 48 hours)     Procedure  Component Value Units Date/Time    Hemoglobin A1c [[id number]] Collected:  04/22/18 0540    Specimen:  Blood Updated:  04/22/18 0805     Hemoglobin A1C 5.0 %     Narrative:       Less than 6.0           Non-Diabetic Range  6.0-7.0                 ADA Therapeutic Target  Greater than 7.0        Action Suggested    CBC & Differential [[id number]] Collected:  04/22/18 0540    Specimen:  Blood Updated:  04/22/18 0654    Narrative:       The following orders were created for panel order CBC & Differential.  Procedure                               Abnormality         Status                     ---------                               -----------         ------                     CBC Auto Differential[[id number]]        Abnormal            Final result                 Please view results for these tests on the individual orders.    CBC Auto Differential [[id number]]  (Abnormal) Collected:  04/22/18 0540    Specimen:  Blood Updated:  04/22/18 0654     WBC 6.12 10*3/mm3      RBC 4.07 (L) 10*6/mm3      Hemoglobin 12.7 g/dL      Hematocrit 37.5 %      MCV 92.1 fL      MCH 31.2 pg      MCHC 33.9 g/dL      RDW 12.3 %      RDW-SD 41.6 fl      MPV 10.8 fL      Platelets 232 10*3/mm3      Neutrophil % 41.6 %      Lymphocyte % 39.4 %      Monocyte % 8.7 %      Eosinophil % 8.5 (H) %      Basophil % 1.1 %      Immature Grans % 0.7 %      Neutrophils, Absolute 2.55 10*3/mm3      Lymphocytes, Absolute 2.41 10*3/mm3      Monocytes, Absolute 0.53 10*3/mm3      Eosinophils, Absolute 0.52 10*3/mm3      Basophils, Absolute 0.07 10*3/mm3      Immature Grans, Absolute 0.04 (H) 10*3/mm3      nRBC 0.0 /100 WBC     Basic Metabolic Panel [[id number]]  (Normal) Collected:  04/22/18 0540    Specimen:  Blood Updated:  04/22/18 0638     Glucose 84 mg/dL      BUN 9 mg/dL      Creatinine 0.70 mg/dL      Sodium 143 mmol/L      Potassium 3.7 mmol/L      Chloride 105 mmol/L      CO2 30.0 mmol/L      Calcium 8.7 mg/dL      eGFR Non African Amer 88 mL/min/1.73       BUN/Creatinine Ratio 12.9     Anion Gap 8.0 mmol/L     Narrative:       GFR Normal >60  Chronic Kidney Disease <60  Kidney Failure <15    Lipase [[id number]]  (Abnormal) Collected:  04/22/18 0540    Specimen:  Blood Updated:  04/22/18 0620     Lipase 231 (H) U/L     C-reactive Protein [[id number]]  (Normal) Collected:  04/22/18 0540    Specimen:  Blood Updated:  04/22/18 0620     C-Reactive Protein <0.50 mg/dL     TSH [[id number]]  (Normal) Collected:  04/21/18 0644    Specimen:  Blood Updated:  04/21/18 0750     TSH 2.730 mIU/mL     Lipid Panel [[id number]]  (Abnormal) Collected:  04/21/18 0644    Specimen:  Blood Updated:  04/21/18 0731     Total Cholesterol 180 mg/dL      Triglycerides 35 mg/dL      HDL Cholesterol 49 (L) mg/dL      LDL Cholesterol  108 (H) mg/dL      LDL/HDL Ratio 2.53    C-reactive Protein [[id number]]  (Normal) Collected:  04/21/18 0644    Specimen:  Blood Updated:  04/21/18 0723     C-Reactive Protein <0.50 mg/dL     Troponin [[id number]]  (Normal) Collected:  04/21/18 0308    Specimen:  Blood Updated:  04/21/18 0423     Troponin I <0.012 ng/mL     Lipase [[id number]]  (Abnormal) Collected:  04/21/18 0308    Specimen:  Blood Updated:  04/21/18 0350     Lipase 10,126 (H) U/L     Urinalysis With / Microscopic If Indicated - Urine, Clean Catch [[id number]]  (Normal) Collected:  04/21/18 0329    Specimen:  Urine from Urine, Clean Catch Updated:  04/21/18 0339     Color, UA Yellow     Appearance, UA Clear     pH, UA <=5.0     Specific Gravity, UA 1.012     Glucose, UA Negative     Ketones, UA Negative     Bilirubin, UA Negative     Blood, UA Negative     Protein, UA Negative     Leuk Esterase, UA Negative     Nitrite, UA Negative     Urobilinogen, UA 0.2 E.U./dL    Narrative:       Urine microscopic not indicated.    Comprehensive Metabolic Panel [[id number]]  (Abnormal) Collected:  04/21/18 0308    Specimen:  Blood Updated:  04/21/18 0327     Glucose 110 (H) mg/dL      BUN 19 mg/dL      Creatinine 0.73  mg/dL      Sodium 143 mmol/L      Potassium 3.8 mmol/L      Chloride 104 mmol/L      CO2 30.0 mmol/L      Calcium 9.2 mg/dL      Total Protein 7.3 g/dL      Albumin 4.20 g/dL      ALT (SGPT) 67 (H) U/L      AST (SGOT) 110 (H) U/L      Alkaline Phosphatase 96 U/L      Total Bilirubin 0.6 mg/dL      eGFR Non African Amer 84 mL/min/1.73      Globulin 3.1 gm/dL      A/G Ratio 1.4 g/dL      BUN/Creatinine Ratio 26.0 (H)     Anion Gap 9.0 mmol/L     CBC & Differential [[id number]] Collected:  04/21/18 0308    Specimen:  Blood Updated:  04/21/18 0316    Narrative:       The following orders were created for panel order CBC & Differential.  Procedure                               Abnormality         Status                     ---------                               -----------         ------                     CBC Auto Differential[[id number]]         Abnormal            Final result                 Please view results for these tests on the individual orders.    CBC Auto Differential [[id number]]  (Abnormal) Collected:  04/21/18 0308    Specimen:  Blood Updated:  04/21/18 0316     WBC 10.87 (H) 10*3/mm3      RBC 4.50 10*6/mm3      Hemoglobin 14.1 g/dL      Hematocrit 40.5 %      MCV 90.0 fL      MCH 31.3 pg      MCHC 34.8 g/dL      RDW 12.2 %      RDW-SD 40.2 fl      MPV 10.2 fL      Platelets 273 10*3/mm3      Neutrophil % 63.3 %      Lymphocyte % 21.5 %      Monocyte % 7.5 %      Eosinophil % 6.6 (H) %      Basophil % 0.7 %      Immature Grans % 0.4 %      Neutrophils, Absolute 6.88 10*3/mm3      Lymphocytes, Absolute 2.34 10*3/mm3      Monocytes, Absolute 0.81 10*3/mm3      Eosinophils, Absolute 0.72 (H) 10*3/mm3      Basophils, Absolute 0.08 10*3/mm3      Immature Grans, Absolute 0.04 (H) 10*3/mm3      nRBC 0.0 /100 WBC         Imaging Results (last 72 hours)     Procedure Component Value Units Date/Time    XR Chest 1 View [[id number]] Collected:  04/21/18 0858     Updated:  04/21/18 0901    Narrative:       XR CHEST 1 VW-  4/21/2018 3:15 AM CDT     HISTORY: Chest pain       COMPARISON: 8/1/2016.     FINDINGS:   The lungs are clear. The cardiomediastinal silhouette and pulmonary  vascularity are unchanged.      The osseous structures and surrounding soft tissues demonstrate no acute  abnormality.       Impression:       1. No radiographic evidence of acute cardiopulmonary process.        This report was finalized on 04/21/2018 08:58 by Dr. Michael Gillis MD.    CT Abdomen Pelvis With Contrast [[id number]] Collected:  04/21/18 0800     Updated:  04/21/18 0805    Narrative:       CT ABDOMEN PELVIS W CONTRAST- 4/21/2018 4:12 AM CDT     HISTORY: Epigastric pain and elevated lipase       COMPARISON: None.      DOSE LENGTH PRODUCT: 291 mGy cm. Automated exposure control was also  utilized to decrease patient radiation dose.     TECHNIQUE: Following the intravenous administration of contrast, helical  CT tomographic images of the abdomen and pelvis were acquired.  Multiplanar reformatted images were provided for review.      FINDINGS:   The lung bases and base of the heart are unremarkable.      LIVER: No focal liver lesion. The hepatic vasculature is patent.      BILIARY SYSTEM: The gallbladder has been removed. There is no  intrahepatic or extrahepatic ductal dilation.      PANCREAS: No focal pancreatic lesion.      SPLEEN: Unremarkable.      KIDNEYS AND ADRENALS: Bilateral kidneys and adrenal glands are  unremarkable. The ureters are decompressed and normal in appearance.     RETROPERITONEUM: No mass, lymphadenopathy or hemorrhage.      GI TRACT: No evidence of obstruction or bowel wall thickening.      OTHER: There is no mesenteric mass, lymphadenopathy or fluid collection.  The abdominopelvic vasculature is patent. The osseous structures and  soft tissues demonstrate no worrisome lesions.          PELVIS: No mass lesion, fluid collection or significant lymphadenopathy  is seen in the pelvis. The urinary bladder is normal in appearance.        Impression:       1. No evidence of acute abdominopelvic process. Specifically, no  evidence of acute pancreatitis.     A preliminary report was provided by [facility]. I  agree with the preliminary interpretation.        This report was finalized on 04/21/2018 08:02 by Dr. Michael Gillis MD.        Hospital Course:  The patient is a 52 y.o. female who presented to [facility] with severe epigastric pain and nausea.  She states that she had never had any pain like this before.  She denies alcohol use.  She had a chicken breast and baked potato for supper last night.  She states that she lost 50 pounds last year on a low carb diet and has not been as faithful this year with her carbohydrate intake.  She denies any fatty foods on the night prior to admission.  She stated that she felt like she was having a heart attack and so they stopped at the EMS station in [facility] but decided to continue to come to the hospital via private car because the pain had let up some.  She did not have any acute EKG changes per ER and her troponin was negative.  She was found to have a lipase of 10,000.  She stated that she has been having fleeting nausea for the past week as well and just started to having vomiting early this morning with the severe pain.  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  She was admitted for further treatment.  She was started on IV fluids with LR and made NPO.  The next day her lipase was <300.  She was started on clear liquids and advanced to regular diet.  She had no difficulties and noted no pain.  She was back to her baseline and ready to be discharged home.  She will follow up with her PCP in one week.      Condition on Discharge:  Stable     Physical Exam on Discharge:  /62 (BP Location: Left arm, Patient Position: Lying)   Pulse 75   Temp 98.3 °F (36.8 °C) (Oral)   Resp 18   Ht  "170.2 cm (67\")   Wt 61.9 kg (136 lb 8 oz)   SpO2 97%   BMI 21.38 kg/m²       Physical Exam   Constitutional: She is oriented to person, place, and time. She appears well-developed and well-nourished.   HENT:   Head: Normocephalic and atraumatic.   Eyes: Conjunctivae and EOM are normal. Pupils are equal, round, and reactive to light.   Neck: Neck supple. No JVD present. No thyromegaly present.   Cardiovascular: Normal rate, regular rhythm, normal heart sounds and intact distal pulses.  Exam reveals no gallop and no friction rub.    No murmur heard.  Pulmonary/Chest: Effort normal and breath sounds normal. No respiratory distress. She has no wheezes. She has no rales. She exhibits no tenderness.   Abdominal: Soft. Bowel sounds are normal. She exhibits no distension. There is no tenderness. There is no rebound and no guarding.   Musculoskeletal: Normal range of motion. She exhibits no edema, tenderness or deformity.   Lymphadenopathy:     She has no cervical adenopathy.   Neurological: She is alert and oriented to person, place, and time. She displays normal reflexes. No cranial nerve deficit. She exhibits normal muscle tone.   Skin: Skin is warm and dry. No rash noted.   Psychiatric: She has a normal mood and affect. Her behavior is normal. Judgment and thought content normal.   Nursing note and vitals reviewed.    Discharge Disposition:  Home or Self Care    Discharge Medications:   Lorrie Marmolejo   Home Medication Instructions NOAH:[id number]    Printed on:04/22/18 1222   Medication Information                      baclofen (LIORESAL) 10 MG tablet  Take 10 mg by mouth 2 (Two) Times a Day.             Biotin (BIOTIN MAXIMUM STRENGTH) 10 MG tablet  Take  by mouth Daily.             cetirizine (zyrTEC) 10 MG tablet  Take 10 mg by mouth Daily.             fluticasone (FLONASE) 50 MCG/ACT nasal spray  2 sprays by Each Nare route Every Night.             HYDROcodone-acetaminophen (NORCO) 7.5-325 MG per tablet  Take " 1 tablet by mouth Every 4 (Four) Hours As Needed for Moderate Pain .             lisinopril (PRINIVIL,ZESTRIL) 20 MG tablet  Take 20 mg by mouth Daily.             magnesium gluconate (MAGONATE) 500 MG tablet  Take 27 mg by mouth Daily.             omeprazole (priLOSEC) 20 MG capsule  Take 20 mg by mouth Daily.             oxybutynin (DITROPAN) 5 MG tablet  Take 5 mg by mouth Daily.             polyethylene glycol (MIRALAX) packet  Take 17 g by mouth Daily.               Discharge Diet:   Diet Instructions     Diet: Regular, Specialty Diet; Thin Liquids, No Restrictions; Low Fat; Thin       Discharge Diet:   Regular  Specialty Diet       Fluid Consistency:  Thin Liquids, No Restrictions    Specialty Diets:  Low Fat    Fluid Consistency:  Thin        Activity at Discharge:   Activity Instructions     Activity as Tolerated           Follow-up Appointments:   1.  Dr. Lang in one week   No future appointments.    Test Results Pending at Discharge: None    MONTSE Jimenez  04/22/18  12:22 PM    Time: 32 minutes     I personally evaluated and examined the patient in conjunction with MONTSE Ovalles and agree with the assessment, treatment plan, and disposition of the patient as recorded by her. My history, exam, and further recommendations are: I have reviewed and agree with the plans. Kt.         Juanpablo Gill MD  04/22/18  5:14 PM      Electronically signed by Juanpablo Gill MD at 4/22/2018  5:15 PM

## 2018-04-22 NOTE — DISCHARGE SUMMARY
[facility]  DISCHARGE SUMMARY       Date of Admission: 4/21/2018  Date of Discharge:  4/22/2018  Primary Care Physician: Oliverio Morrow MD    Presenting Problem/History of Present Illness:  Acute pancreatitis, unspecified complication status, unspecified pancreatitis type [K85.90]     Final Discharge Diagnoses:  Hospital Problem List     Acute pancreatitis        1.  Acute idiopathic pancreatitis  2.  Nausea and vomiting  3.  Acute epigastric pain  4.  Essential hypertension  5.  Hyperlipidemia  6.  Hypothyroidism with adequate TSH    Consults: None    Procedures Performed: None    Pertinent Test Results:   Lab Results (last 48 hours)     Procedure Component Value Units Date/Time    Hemoglobin A1c [[id number]] Collected:  04/22/18 0540    Specimen:  Blood Updated:  04/22/18 0805     Hemoglobin A1C 5.0 %     Narrative:       Less than 6.0           Non-Diabetic Range  6.0-7.0                 ADA Therapeutic Target  Greater than 7.0        Action Suggested    CBC & Differential [[id number]] Collected:  04/22/18 0540    Specimen:  Blood Updated:  04/22/18 0654    Narrative:       The following orders were created for panel order CBC & Differential.  Procedure                               Abnormality         Status                     ---------                               -----------         ------                     CBC Auto Differential[[id number]]        Abnormal            Final result                 Please view results for these tests on the individual orders.    CBC Auto Differential [[id number]]  (Abnormal) Collected:  04/22/18 0540    Specimen:  Blood Updated:  04/22/18 0654     WBC 6.12 10*3/mm3      RBC 4.07 (L) 10*6/mm3      Hemoglobin 12.7 g/dL      Hematocrit 37.5 %      MCV 92.1 fL      MCH 31.2 pg      MCHC 33.9 g/dL      RDW 12.3 %      RDW-SD 41.6 fl      MPV 10.8 fL      Platelets 232 10*3/mm3      Neutrophil % 41.6 %      Lymphocyte % 39.4 %       Monocyte % 8.7 %      Eosinophil % 8.5 (H) %      Basophil % 1.1 %      Immature Grans % 0.7 %      Neutrophils, Absolute 2.55 10*3/mm3      Lymphocytes, Absolute 2.41 10*3/mm3      Monocytes, Absolute 0.53 10*3/mm3      Eosinophils, Absolute 0.52 10*3/mm3      Basophils, Absolute 0.07 10*3/mm3      Immature Grans, Absolute 0.04 (H) 10*3/mm3      nRBC 0.0 /100 WBC     Basic Metabolic Panel [[id number]]  (Normal) Collected:  04/22/18 0540    Specimen:  Blood Updated:  04/22/18 0638     Glucose 84 mg/dL      BUN 9 mg/dL      Creatinine 0.70 mg/dL      Sodium 143 mmol/L      Potassium 3.7 mmol/L      Chloride 105 mmol/L      CO2 30.0 mmol/L      Calcium 8.7 mg/dL      eGFR Non African Amer 88 mL/min/1.73      BUN/Creatinine Ratio 12.9     Anion Gap 8.0 mmol/L     Narrative:       GFR Normal >60  Chronic Kidney Disease <60  Kidney Failure <15    Lipase [[id number]]  (Abnormal) Collected:  04/22/18 0540    Specimen:  Blood Updated:  04/22/18 0620     Lipase 231 (H) U/L     C-reactive Protein [[id number]]  (Normal) Collected:  04/22/18 0540    Specimen:  Blood Updated:  04/22/18 0620     C-Reactive Protein <0.50 mg/dL     TSH [[id number]]  (Normal) Collected:  04/21/18 0644    Specimen:  Blood Updated:  04/21/18 0750     TSH 2.730 mIU/mL     Lipid Panel [[id number]]  (Abnormal) Collected:  04/21/18 0644    Specimen:  Blood Updated:  04/21/18 0731     Total Cholesterol 180 mg/dL      Triglycerides 35 mg/dL      HDL Cholesterol 49 (L) mg/dL      LDL Cholesterol  108 (H) mg/dL      LDL/HDL Ratio 2.53    C-reactive Protein [[id number]]  (Normal) Collected:  04/21/18 0644    Specimen:  Blood Updated:  04/21/18 0723     C-Reactive Protein <0.50 mg/dL     Troponin [[id number]]  (Normal) Collected:  04/21/18 0308    Specimen:  Blood Updated:  04/21/18 0423     Troponin I <0.012 ng/mL     Lipase [[id number]]  (Abnormal) Collected:  04/21/18 0308    Specimen:  Blood Updated:  04/21/18 0350     Lipase 10,126 (H) U/L     Urinalysis With /  Microscopic If Indicated - Urine, Clean Catch [[id number]]  (Normal) Collected:  04/21/18 0329    Specimen:  Urine from Urine, Clean Catch Updated:  04/21/18 0339     Color, UA Yellow     Appearance, UA Clear     pH, UA <=5.0     Specific Gravity, UA 1.012     Glucose, UA Negative     Ketones, UA Negative     Bilirubin, UA Negative     Blood, UA Negative     Protein, UA Negative     Leuk Esterase, UA Negative     Nitrite, UA Negative     Urobilinogen, UA 0.2 E.U./dL    Narrative:       Urine microscopic not indicated.    Comprehensive Metabolic Panel [[id number]]  (Abnormal) Collected:  04/21/18 0308    Specimen:  Blood Updated:  04/21/18 0327     Glucose 110 (H) mg/dL      BUN 19 mg/dL      Creatinine 0.73 mg/dL      Sodium 143 mmol/L      Potassium 3.8 mmol/L      Chloride 104 mmol/L      CO2 30.0 mmol/L      Calcium 9.2 mg/dL      Total Protein 7.3 g/dL      Albumin 4.20 g/dL      ALT (SGPT) 67 (H) U/L      AST (SGOT) 110 (H) U/L      Alkaline Phosphatase 96 U/L      Total Bilirubin 0.6 mg/dL      eGFR Non African Amer 84 mL/min/1.73      Globulin 3.1 gm/dL      A/G Ratio 1.4 g/dL      BUN/Creatinine Ratio 26.0 (H)     Anion Gap 9.0 mmol/L     CBC & Differential [[id number]] Collected:  04/21/18 0308    Specimen:  Blood Updated:  04/21/18 0316    Narrative:       The following orders were created for panel order CBC & Differential.  Procedure                               Abnormality         Status                     ---------                               -----------         ------                     CBC Auto Differential[[id number]]         Abnormal            Final result                 Please view results for these tests on the individual orders.    CBC Auto Differential [[id number]]  (Abnormal) Collected:  04/21/18 0308    Specimen:  Blood Updated:  04/21/18 0316     WBC 10.87 (H) 10*3/mm3      RBC 4.50 10*6/mm3      Hemoglobin 14.1 g/dL      Hematocrit 40.5 %      MCV 90.0 fL      MCH 31.3 pg      MCHC 34.8 g/dL       RDW 12.2 %      RDW-SD 40.2 fl      MPV 10.2 fL      Platelets 273 10*3/mm3      Neutrophil % 63.3 %      Lymphocyte % 21.5 %      Monocyte % 7.5 %      Eosinophil % 6.6 (H) %      Basophil % 0.7 %      Immature Grans % 0.4 %      Neutrophils, Absolute 6.88 10*3/mm3      Lymphocytes, Absolute 2.34 10*3/mm3      Monocytes, Absolute 0.81 10*3/mm3      Eosinophils, Absolute 0.72 (H) 10*3/mm3      Basophils, Absolute 0.08 10*3/mm3      Immature Grans, Absolute 0.04 (H) 10*3/mm3      nRBC 0.0 /100 WBC         Imaging Results (last 72 hours)     Procedure Component Value Units Date/Time    XR Chest 1 View [[id number]] Collected:  04/21/18 0858     Updated:  04/21/18 0901    Narrative:       XR CHEST 1 VW- 4/21/2018 3:15 AM CDT     HISTORY: Chest pain       COMPARISON: 8/1/2016.     FINDINGS:   The lungs are clear. The cardiomediastinal silhouette and pulmonary  vascularity are unchanged.      The osseous structures and surrounding soft tissues demonstrate no acute  abnormality.       Impression:       1. No radiographic evidence of acute cardiopulmonary process.        This report was finalized on 04/21/2018 08:58 by Dr. Michael Gillis MD.    CT Abdomen Pelvis With Contrast [[id number]] Collected:  04/21/18 0800     Updated:  04/21/18 0805    Narrative:       CT ABDOMEN PELVIS W CONTRAST- 4/21/2018 4:12 AM CDT     HISTORY: Epigastric pain and elevated lipase       COMPARISON: None.      DOSE LENGTH PRODUCT: 291 mGy cm. Automated exposure control was also  utilized to decrease patient radiation dose.     TECHNIQUE: Following the intravenous administration of contrast, helical  CT tomographic images of the abdomen and pelvis were acquired.  Multiplanar reformatted images were provided for review.      FINDINGS:   The lung bases and base of the heart are unremarkable.      LIVER: No focal liver lesion. The hepatic vasculature is patent.      BILIARY SYSTEM: The gallbladder has been removed. There is no  intrahepatic or  extrahepatic ductal dilation.      PANCREAS: No focal pancreatic lesion.      SPLEEN: Unremarkable.      KIDNEYS AND ADRENALS: Bilateral kidneys and adrenal glands are  unremarkable. The ureters are decompressed and normal in appearance.     RETROPERITONEUM: No mass, lymphadenopathy or hemorrhage.      GI TRACT: No evidence of obstruction or bowel wall thickening.      OTHER: There is no mesenteric mass, lymphadenopathy or fluid collection.  The abdominopelvic vasculature is patent. The osseous structures and  soft tissues demonstrate no worrisome lesions.          PELVIS: No mass lesion, fluid collection or significant lymphadenopathy  is seen in the pelvis. The urinary bladder is normal in appearance.       Impression:       1. No evidence of acute abdominopelvic process. Specifically, no  evidence of acute pancreatitis.     A preliminary report was provided by [employer] [facility]. I  agree with the preliminary interpretation.        This report was finalized on 04/21/2018 08:02 by Dr. Mcihael Gillis MD.        Hospital Course:  The patient is a 52 y.o. female who presented to [facility] with severe epigastric pain and nausea.  She states that she had never had any pain like this before.  She denies alcohol use.  She had a chicken breast and baked potato for supper last night.  She states that she lost 50 pounds last year on a low carb diet and has not been as faithful this year with her carbohydrate intake.  She denies any fatty foods on the night prior to admission.  She stated that she felt like she was having a heart attack and so they stopped at the EMS station in [facility] but decided to continue to come to the hospital via private car because the pain had let up some.  She did not have any acute EKG changes per ER and her troponin was negative.  She was found to have a lipase of 10,000.  She stated that she has been having fleeting nausea for the past week as well and just started to  "having vomiting early this morning with the severe pain.  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  She was admitted for further treatment.  She was started on IV fluids with LR and made NPO.  The next day her lipase was <300.  She was started on clear liquids and advanced to regular diet.  She had no difficulties and noted no pain.  She was back to her baseline and ready to be discharged home.  She will follow up with her PCP in one week.      Condition on Discharge:  Stable     Physical Exam on Discharge:  /62 (BP Location: Left arm, Patient Position: Lying)   Pulse 75   Temp 98.3 °F (36.8 °C) (Oral)   Resp 18   Ht 170.2 cm (67\")   Wt 61.9 kg (136 lb 8 oz)   SpO2 97%   BMI 21.38 kg/m²      Physical Exam   Constitutional: She is oriented to person, place, and time. She appears well-developed and well-nourished.   HENT:   Head: Normocephalic and atraumatic.   Eyes: Conjunctivae and EOM are normal. Pupils are equal, round, and reactive to light.   Neck: Neck supple. No JVD present. No thyromegaly present.   Cardiovascular: Normal rate, regular rhythm, normal heart sounds and intact distal pulses.  Exam reveals no gallop and no friction rub.    No murmur heard.  Pulmonary/Chest: Effort normal and breath sounds normal. No respiratory distress. She has no wheezes. She has no rales. She exhibits no tenderness.   Abdominal: Soft. Bowel sounds are normal. She exhibits no distension. There is no tenderness. There is no rebound and no guarding.   Musculoskeletal: Normal range of motion. She exhibits no edema, tenderness or deformity.   Lymphadenopathy:     She has no cervical adenopathy.   Neurological: She is alert and oriented to person, place, and time. She displays normal reflexes. No cranial nerve deficit. She exhibits normal muscle tone.   Skin: Skin is warm and dry. No rash noted.   Psychiatric: She has a normal mood and " affect. Her behavior is normal. Judgment and thought content normal.   Nursing note and vitals reviewed.    Discharge Disposition:  Home or Self Care    Discharge Medications:   Lorrie Marmolejo   Home Medication Instructions NOAH:[id number]    Printed on:04/22/18 1222   Medication Information                      baclofen (LIORESAL) 10 MG tablet  Take 10 mg by mouth 2 (Two) Times a Day.             Biotin (BIOTIN MAXIMUM STRENGTH) 10 MG tablet  Take  by mouth Daily.             cetirizine (zyrTEC) 10 MG tablet  Take 10 mg by mouth Daily.             fluticasone (FLONASE) 50 MCG/ACT nasal spray  2 sprays by Each Nare route Every Night.             HYDROcodone-acetaminophen (NORCO) 7.5-325 MG per tablet  Take 1 tablet by mouth Every 4 (Four) Hours As Needed for Moderate Pain .             lisinopril (PRINIVIL,ZESTRIL) 20 MG tablet  Take 20 mg by mouth Daily.             magnesium gluconate (MAGONATE) 500 MG tablet  Take 27 mg by mouth Daily.             omeprazole (priLOSEC) 20 MG capsule  Take 20 mg by mouth Daily.             oxybutynin (DITROPAN) 5 MG tablet  Take 5 mg by mouth Daily.             polyethylene glycol (MIRALAX) packet  Take 17 g by mouth Daily.               Discharge Diet:   Diet Instructions     Diet: Regular, Specialty Diet; Thin Liquids, No Restrictions; Low Fat; Thin       Discharge Diet:   Regular  Specialty Diet       Fluid Consistency:  Thin Liquids, No Restrictions    Specialty Diets:  Low Fat    Fluid Consistency:  Thin        Activity at Discharge:   Activity Instructions     Activity as Tolerated           Follow-up Appointments:   1.  Dr. Lang in one week   No future appointments.    Test Results Pending at Discharge: None    MONTSE Jimenez  04/22/18  12:22 PM    Time: 32 minutes     I personally evaluated and examined the patient in conjunction with MONTSE Ovalles and agree with the assessment, treatment plan, and disposition of the patient as recorded by her. My history,  exam, and further recommendations are: I have reviewed and agree with the plans. Kt.         Juanpablo Gill MD  04/22/18  5:14 PM

## 2018-12-27 ENCOUNTER — HOSPITAL ENCOUNTER (OUTPATIENT)
Dept: WOMENS IMAGING | Age: 53
Discharge: HOME | End: 2018-12-27
Payer: COMMERCIAL

## 2018-12-27 DIAGNOSIS — Z12.39 BREAST CANCER SCREENING: ICD-10-CM

## 2018-12-27 PROCEDURE — 77063 BREAST TOMOSYNTHESIS BI: CPT

## 2019-12-30 ENCOUNTER — HOSPITAL ENCOUNTER (OUTPATIENT)
Dept: WOMENS IMAGING | Age: 54
Discharge: HOME | End: 2019-12-30
Payer: COMMERCIAL

## 2019-12-30 DIAGNOSIS — Z12.31 ENCOUNTER FOR SCREENING MAMMOGRAM FOR BREAST CANCER: ICD-10-CM

## 2019-12-30 PROCEDURE — 77063 BREAST TOMOSYNTHESIS BI: CPT

## 2020-12-28 ENCOUNTER — TELEPHONE (OUTPATIENT)
Dept: OTHER | Age: 55
End: 2020-12-28

## 2020-12-28 NOTE — TELEPHONE ENCOUNTER
Reached out to Marcosjayce Alvarado per the request of Dr. Eliezer Gitelman and after permission from her brother, Shantel Soto. Her brother tested positive for a gene mutation and Marcos Alvarado qualifies for a free genetic test for this gene mutation. We spoke at length about the genetic testing and I placed an order online for an [employer] test kit to be shipped to her home. I went over the instructions on how to complete the test and instructed her to place the kit in a pre-paid envelope and send back when completed. I told her it could take up to 4 weeks for results and that I will reach out as soon as they are complete. She voiced understanding.

## 2021-01-06 ENCOUNTER — HOSPITAL ENCOUNTER (OUTPATIENT)
Dept: WOMENS IMAGING | Age: 56
Discharge: HOME | End: 2021-01-06
Payer: COMMERCIAL

## 2021-01-06 DIAGNOSIS — Z12.31 BREAST CANCER SCREENING BY MAMMOGRAM: ICD-10-CM

## 2021-01-06 PROCEDURE — 77067 SCR MAMMO BI INCL CAD: CPT

## 2021-01-13 ENCOUNTER — HOSPITAL ENCOUNTER (OUTPATIENT)
Dept: WOMENS IMAGING | Age: 56
Discharge: HOME | End: 2021-01-13
Payer: COMMERCIAL

## 2021-01-13 DIAGNOSIS — R92.8 ABNORMAL MAMMOGRAM: ICD-10-CM

## 2021-01-13 PROCEDURE — 77065 DX MAMMO INCL CAD UNI: CPT

## 2021-01-13 PROCEDURE — 76642 ULTRASOUND BREAST LIMITED: CPT

## 2021-01-25 ENCOUNTER — HOSPITAL ENCOUNTER (OUTPATIENT)
Dept: WOMENS IMAGING | Age: 56
Discharge: HOME | End: 2021-01-25
Payer: COMMERCIAL

## 2021-01-25 NOTE — CONSULTS
Completed a virtual post-test results disclosure discussion with patient. The patient's testing identified a clinically actionable gene mutation. We reviewed the cancer risks associated with the corresponding hereditary cancer predisposition syndrome and the summary of relevant medical management changes found in the report. I explained to patient that she needs to see a Genetic Counselor to better understand these results. I made an appointment for her to see Kartik Chamberlain PA-C for further explanation. She also raised concerns about her recent abnormal mammogram results. She would like to talk to [place] about this as well. We discussed the fact that she has two sons and that they will need to be tested as well. She voiced understanding. Results have been uploaded into media and patient has a copy of her results.

## 2021-02-01 ENCOUNTER — OFFICE VISIT (OUTPATIENT)
Dept: SURGERY | Age: 56
End: 2021-02-01
Payer: COMMERCIAL

## 2021-02-01 VITALS
BODY MASS INDEX: 23.83 KG/M2 | TEMPERATURE: 98 F | SYSTOLIC BLOOD PRESSURE: 110 MMHG | HEIGHT: 67 IN | DIASTOLIC BLOOD PRESSURE: 68 MMHG | WEIGHT: 151.8 LBS

## 2021-02-01 DIAGNOSIS — Z15.09 BRCA2 GENE MUTATION POSITIVE IN FEMALE: ICD-10-CM

## 2021-02-01 DIAGNOSIS — Z15.01 BRCA2 GENE MUTATION POSITIVE IN FEMALE: ICD-10-CM

## 2021-02-01 DIAGNOSIS — R92.8 ABNORMAL MAMMOGRAM: Primary | ICD-10-CM

## 2021-02-01 DIAGNOSIS — Z15.02 BRCA2 GENE MUTATION POSITIVE IN FEMALE: ICD-10-CM

## 2021-02-01 PROCEDURE — 99203 OFFICE O/P NEW LOW 30 MIN: CPT | Performed by: PHYSICIAN ASSISTANT

## 2021-02-01 RX ORDER — LISINOPRIL 20 MG/1
20 TABLET ORAL DAILY
COMMUNITY

## 2021-02-01 RX ORDER — VENLAFAXINE 37.5 MG/1
37.5 TABLET ORAL 3 TIMES DAILY
COMMUNITY

## 2021-02-08 PROBLEM — Z15.02 BRCA2 GENE MUTATION POSITIVE IN FEMALE: Status: ACTIVE | Noted: 2021-02-08

## 2021-02-08 PROBLEM — Z15.01 BRCA2 GENE MUTATION POSITIVE IN FEMALE: Status: ACTIVE | Noted: 2021-02-08

## 2021-02-08 PROBLEM — Z15.09 BRCA2 GENE MUTATION POSITIVE IN FEMALE: Status: ACTIVE | Noted: 2021-02-08

## 2021-02-08 NOTE — PROGRESS NOTES
Subjective:      Patient ID: Trang Arellano is a 48 y.o. female. HPI  Ms. Aaron Lay is here to establish care. She recently had genetic testing and has a BRCA2 deleterious mutation. She also had a mammogram that showed an area that was felt to be fat necrosis by the radiologist.  6 month follow up was recommended. She is quite concerned about her mammogram after finding out she has a BRAC2 mutation. Trang Arellano is a 48 y.o. female with the following history as recorded in [facility]: There are no active problems to display for this patient. Current Outpatient Medications   Medication Sig Dispense Refill    lisinopril (PRINIVIL;ZESTRIL) 20 MG tablet Take 20 mg by mouth daily      venlafaxine (EFFEXOR) 37.5 MG tablet Take 37.5 mg by mouth 3 times daily      BIOTIN PO Take by mouth       No current facility-administered medications for this visit. Allergies: Patient has no known allergies. 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    Hypertension     MVP (mitral valve prolapse)      Past Surgical History:   Procedure Laterality Date    BREAST BIOPSY Left 2003    CHOLECYSTECTOMY      COLONOSCOPY  2017    HYSTERECTOMY      age 39-total,vaginal    LASIK       Family History   Problem Relation Age of Onset    Breast Cancer Maternal Grandmother     Ovarian Cancer Mother     Prostate Cancer Brother     Cancer Father         skin cancers     Social History     Tobacco Use    Smoking status: Never Smoker    Smokeless tobacco: Never Used   Substance Use Topics    Alcohol use: Never     Frequency: Never       Review of Systems   All other systems reviewed and are negative. Objective:   Physical Exam  Constitutional:       Appearance: Normal appearance. HENT:      Head: Normocephalic and atraumatic. Eyes:      Extraocular Movements: Extraocular movements intact.       Conjunctiva/sclera: Conjunctivae normal.      Pupils: Pupils are equal, round, and reactive to light. Neurological:      General: No focal deficit present. Mental Status: She is alert and oriented to person, place, and time. Psychiatric:         Mood and Affect: Mood normal.         Behavior: Behavior normal.         Thought Content: Thought content normal.         Judgment: Judgment normal.         Assessment:      BRCA2 Mutation  Abnormal mammogram      Plan:      We discussed management to include increased screening with breast MRI, tamoxifen, and bilateral mastectomies and reconstruction. We will plan a biopsy of the abnormality seen on her breast imaging.           Governor Schwab, PA-C

## 2021-02-15 ENCOUNTER — TELEPHONE (OUTPATIENT)
Dept: SURGERY | Age: 56
End: 2021-02-15

## 2021-02-15 NOTE — TELEPHONE ENCOUNTER
Thaddeus Cisse called to cancel this morning due to weather. Please contact to r/s procedure with Dr. Brooks Larkin. Thank you.

## 2021-02-24 ENCOUNTER — PROCEDURE VISIT (OUTPATIENT)
Dept: SURGERY | Age: 56
End: 2021-02-24
Payer: COMMERCIAL

## 2021-02-24 ENCOUNTER — HOSPITAL ENCOUNTER (OUTPATIENT)
Dept: WOMENS IMAGING | Age: 56
Discharge: HOME | End: 2021-02-24
Payer: COMMERCIAL

## 2021-02-24 DIAGNOSIS — N63.0 BREAST MASS: Primary | ICD-10-CM

## 2021-02-24 DIAGNOSIS — R92.8 ABNORMAL MAMMOGRAM: ICD-10-CM

## 2021-02-24 PROCEDURE — 88305 TISSUE EXAM BY PATHOLOGIST: CPT

## 2021-02-24 PROCEDURE — 19083 BX BREAST 1ST LESION US IMAG: CPT | Performed by: SURGERY

## 2021-02-24 PROCEDURE — 77065 DX MAMMO INCL CAD UNI: CPT

## 2021-02-26 NOTE — RESULT ENCOUNTER NOTE
Please let pt know her biopsy was benign. Please schedule her follow up with Dr. Nguyen Yadav to discuss further management of her BRCA mutation.

## 2021-03-05 NOTE — PROGRESS NOTES
HISTORY OF PRESENT ILLNESS:    Ms. Federico Alexander comes in today for 2 week follow-up after uncomplicated  Left  ultrasound guided breast biopsy. Pathology demonstrated fat necrosis with associated calcifications. She has a BRAC2 mutation. She has appropriate postoperative discomfort, and no significant complaints. At the time of her biopsy, she was seriously contemplating bilateral mastectomy and reconstruction. Since her biopsy returned benign, she has really lost interest in surgical prophylaxis. She is interested in being monitored closely however. PHYSICAL EXAM:  Her wound is well healed with no evidence of infection or hematoma. IMPRESSION:   Fat necrosis with associated calcifications. BRAC2 mutation    PLAN: Will start her on Tamoxifen 20 mg daily. I will see her back in 6 months with a unilateral left mammogram and breast MRI with a physical exam. She will call me with any new concerns. I have seen, examined and reviewed this patient medication list, appropriate labs and imaging studies. I reviewed relevant medical records and others physicians notes. I discussed the plans of care with the patient. I answered all the questions to the patients satisfaction. I, Dr Maria Elena Brewster, personally performed the services described in this documentation as scribed by Missael Ramos MA in my presence and is both accurate and complete. (Please note that portions of this note were completed with a voice recognition program. Efforts were made to edit the dictations but occasionally words are mis-transcribed.)  Over 50% of the total visit time of 30 minutes in face to face encounter with the patient, out of which more than 50% of the time was spent in counseling patient or family and coordination of care. Counseling included but was not limited to time spent reviewing labs, imaging studies/ treatment plan and answering questions.

## 2021-03-08 ENCOUNTER — OFFICE VISIT (OUTPATIENT)
Dept: SURGERY | Age: 56
End: 2021-03-08
Payer: COMMERCIAL

## 2021-03-08 VITALS — HEART RATE: 80 BPM | DIASTOLIC BLOOD PRESSURE: 84 MMHG | SYSTOLIC BLOOD PRESSURE: 128 MMHG

## 2021-03-08 DIAGNOSIS — Z15.02 BRCA2 GENE MUTATION POSITIVE IN FEMALE: Primary | ICD-10-CM

## 2021-03-08 DIAGNOSIS — Z15.09 BRCA2 GENE MUTATION POSITIVE IN FEMALE: Primary | ICD-10-CM

## 2021-03-08 DIAGNOSIS — Z98.890 S/P BREAST BIOPSY: ICD-10-CM

## 2021-03-08 DIAGNOSIS — Z15.01 BRCA2 GENE MUTATION POSITIVE IN FEMALE: Primary | ICD-10-CM

## 2021-03-08 PROCEDURE — 99214 OFFICE O/P EST MOD 30 MIN: CPT | Performed by: SURGERY

## 2021-03-08 RX ORDER — TAMOXIFEN CITRATE 20 MG/1
20 TABLET ORAL DAILY
Qty: 30 TABLET | Refills: 5 | Status: SHIPPED | OUTPATIENT
Start: 2021-03-08 | End: 2021-08-31 | Stop reason: SDUPTHER

## 2021-03-09 ENCOUNTER — TELEPHONE (OUTPATIENT)
Dept: SURGERY | Age: 56
End: 2021-03-09

## 2021-03-09 DIAGNOSIS — R92.8 ABNORMAL MAMMOGRAM: ICD-10-CM

## 2021-03-09 DIAGNOSIS — Z15.01 BRCA2 GENE MUTATION POSITIVE IN FEMALE: Primary | ICD-10-CM

## 2021-03-09 DIAGNOSIS — Z15.09 BRCA2 GENE MUTATION POSITIVE IN FEMALE: Primary | ICD-10-CM

## 2021-03-09 DIAGNOSIS — Z15.02 BRCA2 GENE MUTATION POSITIVE IN FEMALE: Primary | ICD-10-CM

## 2021-03-09 PROBLEM — Z98.890 S/P BREAST BIOPSY: Status: ACTIVE | Noted: 2021-03-09

## 2021-03-09 NOTE — TELEPHONE ENCOUNTER
Before patient left office yesterday afternoon she asked me to send prescription to her Mail in. When looked we did not have that on file. I called patient this morning to get that info and she was at work. I left a message with her  to call me back.

## 2021-05-24 ENCOUNTER — OFFICE VISIT (OUTPATIENT)
Age: 56
End: 2021-05-24

## 2021-05-24 ENCOUNTER — OFFICE VISIT (OUTPATIENT)
Dept: URGENT CARE | Age: 56
End: 2021-05-24
Payer: COMMERCIAL

## 2021-05-24 VITALS
HEIGHT: 68 IN | OXYGEN SATURATION: 99 % | TEMPERATURE: 98.2 F | SYSTOLIC BLOOD PRESSURE: 116 MMHG | RESPIRATION RATE: 20 BRPM | WEIGHT: 149.4 LBS | HEART RATE: 85 BPM | DIASTOLIC BLOOD PRESSURE: 74 MMHG | BODY MASS INDEX: 22.64 KG/M2

## 2021-05-24 DIAGNOSIS — Z11.59 SCREENING FOR VIRAL DISEASE: Primary | ICD-10-CM

## 2021-05-24 DIAGNOSIS — R53.83 FATIGUE, UNSPECIFIED TYPE: ICD-10-CM

## 2021-05-24 DIAGNOSIS — J01.00 ACUTE NON-RECURRENT MAXILLARY SINUSITIS: Primary | ICD-10-CM

## 2021-05-24 DIAGNOSIS — R50.9 FEVER, UNSPECIFIED FEVER CAUSE: ICD-10-CM

## 2021-05-24 LAB — SARS-COV-2, PCR: NOT DETECTED

## 2021-05-24 PROCEDURE — 99999 PR OFFICE/OUTPT VISIT,PROCEDURE ONLY: CPT | Performed by: NURSE PRACTITIONER

## 2021-05-24 PROCEDURE — 99213 OFFICE O/P EST LOW 20 MIN: CPT | Performed by: NURSE PRACTITIONER

## 2021-05-24 RX ORDER — METHYLPREDNISOLONE 4 MG/1
TABLET ORAL
Qty: 1 KIT | Refills: 0 | Status: SHIPPED | OUTPATIENT
Start: 2021-05-24 | End: 2021-05-30

## 2021-05-24 RX ORDER — AMOXICILLIN 500 MG/1
500 CAPSULE ORAL 2 TIMES DAILY
COMMUNITY

## 2021-05-24 ASSESSMENT — ENCOUNTER SYMPTOMS
SINUS PAIN: 1
SHORTNESS OF BREATH: 1
SINUS PRESSURE: 1
STRIDOR: 0
COUGH: 0
WHEEZING: 0
GASTROINTESTINAL NEGATIVE: 1

## 2021-05-24 NOTE — PROGRESS NOTES
[address] [place] [address]   [address], [address]     Phone:  [phone]  Fax:  [phone]      Katelin Poe is a 48 y.o. female who presents today for her medical conditions/complaints as noted below. Katelin Poe is c/o of Fever (been on antibiotic x 1 week and not feeling any better) and Congestion      Chief Complaint   Patient presents with    Fever     been on antibiotic x 1 week and not feeling any better    Congestion       HPI:     HPI    Katelin Poe presents today for fever and congestion x 11 days. She was diagnosed with a sinus infection 1 week ago and started on Amoxicillin. She states she has not improved. She began having fatigue and fevers 4 days ago. She also has shortness of breath, sinus pain/pressure, and congestion. She has take ibuprofen for fevers. She last took ibuprofen 2 hours ago and this has been effective. She is concerned about COVID. She has not had any obvious ill contacts. She was vaccinated against COVID on 4/29, second dose. Temp max 100.9    Past Medical History:   Diagnosis Date    Anxiety     Cancer ([address]     scc-facial    Hypertension     MVP (mitral valve prolapse)         Past Surgical History:   Procedure Laterality Date    BREAST BIOPSY Left 2003    CHOLECYSTECTOMY      COLONOSCOPY  2017    HYSTERECTOMY      age 39-total,vaginal   [de-identified] LASIK         Social History     Tobacco Use    Smoking status: Never Smoker    Smokeless tobacco: Never Used   Substance Use Topics    Alcohol use: Never        Current Outpatient Medications   Medication Sig Dispense Refill    amoxicillin (AMOXIL) 500 MG capsule Take 500 mg by mouth 2 times daily      methylPREDNISolone (MEDROL DOSEPACK) 4 MG tablet Take by mouth.  1 kit 0    tamoxifen (NOLVADEX) 20 MG tablet Take 1 tablet by mouth daily 30 tablet 5    lisinopril (PRINIVIL;ZESTRIL) 20 MG tablet Take 20 mg by mouth daily      venlafaxine (EFFEXOR) 37.5 MG tablet Take 37.5 mg by mouth 3 times daily  BIOTIN PO Take by mouth       No current facility-administered medications for this visit. No Known Allergies    Family History   Problem Relation Age of Onset    Breast Cancer Maternal Grandmother     Ovarian Cancer Mother     Prostate Cancer Brother     Cancer Father         skin cancers               Review of Systems   Constitutional: Positive for fatigue and fever. HENT: Positive for congestion, sinus pressure and sinus pain. Respiratory: Positive for shortness of breath. Negative for cough, wheezing and stridor. Cardiovascular: Negative for chest pain, palpitations and leg swelling. Gastrointestinal: Negative. Objective:     Physical Exam  Vitals and nursing note reviewed. Constitutional:       General: She is not in acute distress. Appearance: She is well-developed. HENT:      Head: Normocephalic and atraumatic. Right Ear: External ear normal.      Left Ear: External ear normal.      Nose: Congestion present. No rhinorrhea. Right Sinus: Maxillary sinus tenderness present. Left Sinus: Maxillary sinus tenderness present. Mouth/Throat:      Dentition: Normal dentition. Pharynx: Oropharynx is clear. No oropharyngeal exudate or posterior oropharyngeal erythema. Eyes:      General:         Right eye: No discharge. Left eye: No discharge. Conjunctiva/sclera: Conjunctivae normal.      Pupils: Pupils are equal, round, and reactive to light. Cardiovascular:      Rate and Rhythm: Normal rate and regular rhythm. Pulses: Normal pulses. Pulmonary:      Effort: Pulmonary effort is normal. No respiratory distress. Breath sounds: Normal breath sounds. No stridor. No wheezing, rhonchi or rales. Musculoskeletal:         General: Normal range of motion. Cervical back: Normal range of motion and neck supple. Lumbar back: Normal range of motion. Right lower leg: No edema. Left lower leg: No edema.    Lymphadenopathy: Cervical: No cervical adenopathy. Skin:     General: Skin is warm and dry. Capillary Refill: Capillary refill takes less than 2 seconds. Findings: No rash. Neurological:      General: No focal deficit present. Mental Status: She is alert and oriented to person, place, and time. Mental status is at baseline. Psychiatric:         Mood and Affect: Mood normal.         Behavior: Behavior normal.         /74   Pulse 85   Temp 98.2 °F (36.8 °C)   Resp 20   Ht 5' 8\" (1.727 m)   Wt 149 lb 6.4 oz (67.8 kg)   SpO2 99%   BMI 22.72 kg/m²     Assessment:      Diagnosis Orders   1. Acute non-recurrent maxillary sinusitis  methylPREDNISolone (MEDROL DOSEPACK) 4 MG tablet   2. Fatigue, unspecified type     3. Fever, unspecified fever cause         No results found for this visit on 05/24/21. Plan:     Continue Amoxil    Start medrol dosepak    Tylenol/motrin for fevers/pains    COVID test today    Quarantine until results return    Return if symptoms worsen or fail to improve. No orders of the defined types were placed in this encounter. Orders Placed This Encounter   Medications    methylPREDNISolone (MEDROL DOSEPACK) 4 MG tablet     Sig: Take by mouth. Dispense:  1 kit     Refill:  0        Patient offered educational materials - see patient instructions for any instruction needed. Discussed use, benefit, and side effects of prescribed medications. All patient questions answered. Instructed to continue current medications, diet and exercise. Patient agreed with treatment plan. Follow up as directed. Patient was advised to go to the ED if condition ever becomes emergent.        Electronically signed by Loni Downs on 5/24/2021 at 12:33 PM

## 2021-08-23 ENCOUNTER — HOSPITAL ENCOUNTER (OUTPATIENT)
Dept: WOMENS IMAGING | Age: 56
Discharge: HOME | End: 2021-08-23
Payer: COMMERCIAL

## 2021-08-23 ENCOUNTER — HOSPITAL ENCOUNTER (OUTPATIENT)
Dept: MRI IMAGING | Age: 56
Discharge: HOME | End: 2021-08-23
Payer: COMMERCIAL

## 2021-08-23 DIAGNOSIS — Z15.09 BRCA2 GENE MUTATION POSITIVE IN FEMALE: ICD-10-CM

## 2021-08-23 DIAGNOSIS — Z15.02 BRCA2 GENE MUTATION POSITIVE IN FEMALE: ICD-10-CM

## 2021-08-23 DIAGNOSIS — R92.8 ABNORMAL MAMMOGRAM: ICD-10-CM

## 2021-08-23 DIAGNOSIS — Z15.01 BRCA2 GENE MUTATION POSITIVE IN FEMALE: ICD-10-CM

## 2021-08-23 PROCEDURE — A9577 INJ MULTIHANCE: HCPCS | Performed by: SURGERY

## 2021-08-23 PROCEDURE — 77049 MRI BREAST C-+ W/CAD BI: CPT

## 2021-08-23 PROCEDURE — 77065 DX MAMMO INCL CAD UNI: CPT

## 2021-08-23 PROCEDURE — 6360000004 HC RX CONTRAST MEDICATION: Performed by: SURGERY

## 2021-08-23 RX ADMIN — GADOBENATE DIMEGLUMINE 13 ML: 529 INJECTION, SOLUTION INTRAVENOUS at 09:22

## 2021-08-24 NOTE — PROGRESS NOTES
HISTORY OF PRESENT ILLNESS:    Ms. Adriana Dobbins comes in today for a five month breast exam followed by a bilateral mammogram and breast MRI. She is status post Left ultrasound guided breast biopsy. Pathology demonstrated fat necrosis with associated calcifications. She has a BRAC2 mutation. She has appropriate postoperative discomfort, and no significant complaints. At the time of her biopsy, she was seriously contemplating bilateral mastectomy and reconstruction. Since her biopsy returned benign, she has really lost interest in surgical prophylaxis. She is interested in being monitored closely however. MRI-8/23/2021  Findings:  Breast composition: Scattered fibroglandular tissue  Background parenchymal enhancement: Minimal  Anterior left breast biopsy clip. There is a tiny round T1 bright, T2  hypointense and nonenhancing lesion in the anterior inner left breast  near 10:00. No other lesions are identified. No enhancing lesions are  identified. No suspicious nonmass enhancement. Breasts are symmetric  in size. No abnormal skin thickening or nipple retraction. No axillary  or subpectoral adenopathy. IMPRESSION:  Impression:  1. Approximately 3 mm anterior inner left breast benign-appearing  cystic lesion, just deep to the skin, near 10:00. This is  intrinsically bright on T1 but is nonenhancing, with differential  including a cyst with debris, oil cyst perhaps perhaps a resolving  hematoma from earlier biopsy. 2. There are no suspicious/enhancing masses or any suspicious nonmass  enhancement. 3. No adenopathy. BI-RADS Category 2-benign findings  Signed by Dr Mathew Cheung    Unilateral Left Mammogram-8/23/2021  FINDINGS:   Postsurgical changes identified in the left breast with localization  clip appreciated just superior medial to the nipple. The subtle area of asymmetric density noted on the prebiopsy images  are no longer present.   Adjacent to the localization clip, anteriorly and medially there is a  small benign-appearing nodule measuring approximate 4 mm diameter. The MR image demonstrates nonenhancing well-circumscribed T1 bright  lesion which may represent all cyst possibly postop hemorrhagic cyst,  benign features. Mammograms are otherwise stable without malignant features. Bilateral mammographic follow-up recommended in 6 months. IMPRESSION:  1. Benign postsurgical changes left breast. No malignant features. 2. ACR BI-RADS Category 2, benign findings. Signed by Dr Ag Kurtz:  Her wound is well healed with no evidence of infection or hematoma. IMPRESSION:   Fat necrosis with associated calcifications. BRAC2 mutation    PLAN: 6 months for physical exam    I have seen, examined and reviewed this patient medication list, appropriate labs and imaging studies. I reviewed relevant medical records and others physicians notes. I discussed the plans of care with the patient. I answered all the questions to the patients satisfaction. I, Dr Terry Cornejo, personally performed the services described in this documentation as scribed by Antionette Larose MA in my presence and is both accurate and complete. (Please note that portions of this note were completed with a voice recognition program. Efforts were made to edit the dictations but occasionally words are mis-transcribed.)  Over 50% of the total visit time of 20 minutes in face to face encounter with the patient, out of which more than 50% of the time was spent in counseling patient or family and coordination of care. Counseling included but was not limited to time spent reviewing labs, imaging studies/ treatment plan and answering questions.

## 2021-08-25 ENCOUNTER — OFFICE VISIT (OUTPATIENT)
Dept: SURGERY | Age: 56
End: 2021-08-25
Payer: COMMERCIAL

## 2021-08-25 VITALS — SYSTOLIC BLOOD PRESSURE: 128 MMHG | HEART RATE: 76 BPM | DIASTOLIC BLOOD PRESSURE: 82 MMHG

## 2021-08-25 DIAGNOSIS — Z15.02 BRCA2 GENE MUTATION POSITIVE IN FEMALE: Primary | ICD-10-CM

## 2021-08-25 DIAGNOSIS — Z15.01 BRCA2 GENE MUTATION POSITIVE IN FEMALE: Primary | ICD-10-CM

## 2021-08-25 DIAGNOSIS — Z15.09 BRCA2 GENE MUTATION POSITIVE IN FEMALE: Primary | ICD-10-CM

## 2021-08-25 PROCEDURE — 99213 OFFICE O/P EST LOW 20 MIN: CPT | Performed by: SURGERY

## 2021-08-31 RX ORDER — TAMOXIFEN CITRATE 20 MG/1
20 TABLET ORAL DAILY
Qty: 90 TABLET | Refills: 3 | Status: SHIPPED | OUTPATIENT
Start: 2021-08-31

## 2022-03-04 ENCOUNTER — HOSPITAL ENCOUNTER (OUTPATIENT)
Dept: WOMENS IMAGING | Age: 57
Discharge: HOME | End: 2022-03-04
Payer: COMMERCIAL

## 2022-03-04 DIAGNOSIS — Z12.31 ENCOUNTER FOR SCREENING MAMMOGRAM FOR MALIGNANT NEOPLASM OF BREAST: ICD-10-CM

## 2022-03-04 PROCEDURE — 77067 SCR MAMMO BI INCL CAD: CPT

## 2022-03-08 NOTE — PROGRESS NOTES
HISTORY OF PRESENT ILLNESS:    Ms. Yanna Baker presents today for a 6-month of a BRCA2 mutation. She has been taking tamoxifen for chemoprophylaxis. She is status post Left ultrasound guided breast biopsy. Pathology demonstrated fat necrosis with associated calcifications. At the time of her biopsy, she was seriously contemplating bilateral mastectomy and reconstruction. Since her biopsy returned benign, she has really lost interest in surgical prophylaxis. She is interested in being monitored closely however. MRI-8/23/2021  Findings:  Breast composition: Scattered fibroglandular tissue  Background parenchymal enhancement: Minimal  Anterior left breast biopsy clip. There is a tiny round T1 bright, T2  hypointense and nonenhancing lesion in the anterior inner left breast  near 10:00. No other lesions are identified. No enhancing lesions are  identified. No suspicious nonmass enhancement. Breasts are symmetric  in size. No abnormal skin thickening or nipple retraction. No axillary  or subpectoral adenopathy. IMPRESSION:  Impression:  1. Approximately 3 mm anterior inner left breast benign-appearing  cystic lesion, just deep to the skin, near 10:00. This is  intrinsically bright on T1 but is nonenhancing, with differential  including a cyst with debris, oil cyst perhaps perhaps a resolving  hematoma from earlier biopsy. 2. There are no suspicious/enhancing masses or any suspicious nonmass  enhancement. 3. No adenopathy. BI-RADS Category 2-benign findings  Signed by Dr Jerry Jones    Bilateral mammogram-3/4/2022  FINDINGS:    Digital CC and MLO views of bilateral breasts were obtained. Tomosynthesis in the MLO and CC projections was also performed. There are scattered fibroglandular densities (approximately 25-50%   glandular tissue) consistent with a type B parenchymal pattern. No   suspicious masses or calcifications are identified.     There is no architectural distortion.    This study was interpreted with CAD. IMPRESSION AND RECOMMENDATION:    No mammographic evidence of malignancy. Recommendation is for the   patient to return for routine mammography in one year or sooner, if   clinically indicated.    Assessment: BI-RADS Category 1 negative   Signed by Dr Checo Newton:  She has fibrocystic changes throughout both breasts. There are no dominant masses, no skin or nipple changes, and no extra adenopathy. There is no evidence of carcinoma on physical exam.    IMPRESSION:   Fat necrosis with associated calcifications. BRAC2 mutation    PLAN: Follow-up in 6 months with breast MRI              Continue tamoxifen    I have seen, examined and reviewed this patient medication list, appropriate labs and imaging studies. I reviewed relevant medical records and others physicians notes. I discussed the plans of care with the patient. I answered all the questions to the patients satisfaction. I, Dr Keshia Love, personally performed the services described in this documentation as scribed by Luke Osborne MA in my presence and is both accurate and complete. (Please note that portions of this note were completed with a voice recognition program. Efforts were made to edit the dictations but occasionally words are mis-transcribed.)  Over 50% of the total visit time of 20 minutes in face to face encounter with the patient, out of which more than 50% of the time was spent in counseling patient or family and coordination of care. Counseling included but was not limited to time spent reviewing labs, imaging studies/ treatment plan and answering questions.

## 2022-03-09 ENCOUNTER — OFFICE VISIT (OUTPATIENT)
Dept: SURGERY | Age: 57
End: 2022-03-09
Payer: COMMERCIAL

## 2022-03-09 VITALS
HEIGHT: 67 IN | BODY MASS INDEX: 25.05 KG/M2 | SYSTOLIC BLOOD PRESSURE: 108 MMHG | DIASTOLIC BLOOD PRESSURE: 76 MMHG | TEMPERATURE: 98.1 F | WEIGHT: 159.6 LBS

## 2022-03-09 DIAGNOSIS — Z15.09 BRCA2 GENE MUTATION POSITIVE IN FEMALE: Primary | ICD-10-CM

## 2022-03-09 DIAGNOSIS — Z15.02 BRCA2 GENE MUTATION POSITIVE IN FEMALE: Primary | ICD-10-CM

## 2022-03-09 DIAGNOSIS — Z15.01 BRCA2 GENE MUTATION POSITIVE IN FEMALE: Primary | ICD-10-CM

## 2022-03-09 DIAGNOSIS — Z98.890 S/P BREAST BIOPSY: ICD-10-CM

## 2022-03-09 PROCEDURE — 99213 OFFICE O/P EST LOW 20 MIN: CPT | Performed by: SURGERY

## 2022-03-11 DIAGNOSIS — Z15.02 BRCA2 GENE MUTATION POSITIVE IN FEMALE: Primary | ICD-10-CM

## 2022-03-11 DIAGNOSIS — Z15.09 BRCA2 GENE MUTATION POSITIVE IN FEMALE: Primary | ICD-10-CM

## 2022-03-11 DIAGNOSIS — Z15.01 BRCA2 GENE MUTATION POSITIVE IN FEMALE: Primary | ICD-10-CM

## 2022-08-12 ENCOUNTER — TELEPHONE (OUTPATIENT)
Dept: SURGERY | Age: 57
End: 2022-08-12

## 2022-08-12 NOTE — TELEPHONE ENCOUNTER
Left Message  MRI Scheduled on [email or web address] [facility]. Instructions given.   Will see Dr. Kay Christianson on 9/8/2022 @10:30 AM

## 2022-09-06 NOTE — PROGRESS NOTES
HISTORY OF PRESENT ILLNESS:    Ms. Ag Song presents today for a 6-month exam, following MRI for a BRCA2 mutation. She has been taking tamoxifen for chemoprophylaxis. She is status post Left ultrasound guided breast biopsy. Pathology demonstrated fat necrosis with associated calcifications. At the time of her biopsy, she was seriously contemplating bilateral mastectomy and reconstruction. Since her biopsy returned benign, she has really lost interest in surgical prophylaxis. She is interested in being monitored closely however. MRI-9/7/2022     Bilateral breast MRI without and with contrast   COMPARISON:    Prior breast MRI August 23, 2021, correlation made with mammography   including most recent mammogram March 4, 2022   TECHNICAL:    Multiplanar, multisequence imaging was performed through the breasts   before and after the administration of IV contrast.   FINDINGS:   Background parenchymal enhancement:   Minimal   Fibroglandular tissue:   Scattered   Right breast:   Negative   Left breast:   There is an area of postbiopsy change in the left breast with an   biopsy clip artifact in the very anterior subareolar location. There   is no suspicious enhancement at this location. There is no suspicious   findings seen in the left breast.   Lymph nodes:   No abnormal lymph nodes identified. Impression   1. Bilateral breast, BI-RADS 2, benign. No suspicious MRI finding seen   at this time. Recommendation is continued screening mammography and   adjunct to screening with breast MRI in this patient known to be high   risk based on genetic mutation. Patient will be due for bilateral   screening mammography in March 2023. Overall assessment BI-RADS 2, benign. I reviewed her MRI though it has not been officially read yet. I see no worrisome enhancing lesions at this time.   We will of course await final report     Bilateral mammogram-3/4/2022  FINDINGS:    Digital CC and MLO views of bilateral

## 2022-09-07 ENCOUNTER — HOSPITAL ENCOUNTER (OUTPATIENT)
Dept: MRI IMAGING | Age: 57
Discharge: HOME | End: 2022-09-07
Payer: COMMERCIAL

## 2022-09-07 DIAGNOSIS — Z15.09 BRCA2 GENE MUTATION POSITIVE IN FEMALE: ICD-10-CM

## 2022-09-07 DIAGNOSIS — Z15.01 BRCA2 GENE MUTATION POSITIVE IN FEMALE: ICD-10-CM

## 2022-09-07 DIAGNOSIS — Z15.02 BRCA2 GENE MUTATION POSITIVE IN FEMALE: ICD-10-CM

## 2022-09-07 PROCEDURE — 6360000004 HC RX CONTRAST MEDICATION: Performed by: SURGERY

## 2022-09-07 PROCEDURE — A9577 INJ MULTIHANCE: HCPCS | Performed by: SURGERY

## 2022-09-07 PROCEDURE — C8908 MRI W/O FOL W/CONT, BREAST,: HCPCS

## 2022-09-07 RX ADMIN — GADOBENATE DIMEGLUMINE 15 ML: 529 INJECTION, SOLUTION INTRAVENOUS at 09:27

## 2022-09-08 ENCOUNTER — OFFICE VISIT (OUTPATIENT)
Dept: SURGERY | Age: 57
End: 2022-09-08
Payer: COMMERCIAL

## 2022-09-08 VITALS — DIASTOLIC BLOOD PRESSURE: 84 MMHG | HEART RATE: 80 BPM | SYSTOLIC BLOOD PRESSURE: 128 MMHG

## 2022-09-08 DIAGNOSIS — Z12.31 VISIT FOR SCREENING MAMMOGRAM: Primary | ICD-10-CM

## 2022-09-08 DIAGNOSIS — Z15.09 BRCA2 GENE MUTATION POSITIVE IN FEMALE: ICD-10-CM

## 2022-09-08 DIAGNOSIS — Z15.02 BRCA2 GENE MUTATION POSITIVE IN FEMALE: ICD-10-CM

## 2022-09-08 DIAGNOSIS — Z15.01 BRCA2 GENE MUTATION POSITIVE IN FEMALE: ICD-10-CM

## 2022-09-08 PROCEDURE — 99214 OFFICE O/P EST MOD 30 MIN: CPT | Performed by: SURGERY

## 2023-03-21 ENCOUNTER — HOSPITAL ENCOUNTER (OUTPATIENT)
Dept: WOMENS IMAGING | Age: 58
Discharge: HOME | End: 2023-03-21
Payer: COMMERCIAL

## 2023-03-21 VITALS — WEIGHT: 160 LBS | BODY MASS INDEX: 25.06 KG/M2

## 2023-03-21 DIAGNOSIS — Z12.31 VISIT FOR SCREENING MAMMOGRAM: ICD-10-CM

## 2023-03-21 PROCEDURE — 77063 BREAST TOMOSYNTHESIS BI: CPT

## 2023-03-27 ENCOUNTER — OFFICE VISIT (OUTPATIENT)
Dept: SURGERY | Age: 58
End: 2023-03-27
Payer: COMMERCIAL

## 2023-03-27 VITALS
HEIGHT: 67 IN | OXYGEN SATURATION: 96 % | WEIGHT: 163.6 LBS | HEART RATE: 71 BPM | BODY MASS INDEX: 25.68 KG/M2 | TEMPERATURE: 97.7 F

## 2023-03-27 DIAGNOSIS — Z15.01 BRCA2 GENE MUTATION POSITIVE IN FEMALE: Primary | ICD-10-CM

## 2023-03-27 DIAGNOSIS — Z15.02 BRCA2 GENE MUTATION POSITIVE IN FEMALE: Primary | ICD-10-CM

## 2023-03-27 DIAGNOSIS — Z15.09 BRCA2 GENE MUTATION POSITIVE IN FEMALE: Primary | ICD-10-CM

## 2023-03-27 PROCEDURE — 99212 OFFICE O/P EST SF 10 MIN: CPT | Performed by: PHYSICIAN ASSISTANT

## 2023-03-27 NOTE — PROGRESS NOTES
Subjective:      Patient ID: Juliann Ruiz is a 65 y.o. female. HPI:  Juliann Ruiz is in for follow-up breast check. She has a BRCA2 mutation. She has stopped the tamoxifen due to side effects. She has not noticed any changes in her breasts. Her imaging was reviewed and is noted below. 3/22/2023 9:22 AM   [facility] DIGITAL SCREEN W OR WO CAD BILATERAL   Noted in history is that patient is BRCA2 positive. SCREENING MAMMOGRAPHY:  Today's examination consists of 2-D/3-D combo   standard craniocaudal and oblique views of both breasts. Comparison is   made  to all prior mammograms since November 22, 2016 . Breast   parenchyma is scattered fibroglandular densities. There there is a   stable postsurgical scar with distortion in the left breast upper   outer quadrant. There is a biopsy clip in the left breast subareolar   location. No suspicious mammographic finding seen in either breast.   The mammograms were evaluated using computer aided detection. Impression   Impression:   Bilateral Breasts, BI-RADS 2, benign. Recommendation is annual   screening mammography. Given patient's high risk status (BRCA 2   positivity) recommendation is also for continued screening with MRI   for which she will be due in September 2023. Overall assessment BI-RADS 2, benign. BREAST EXAM:  On examination, she has fibrocystic changes throughout both breasts, no dominant masses, no skin or nipple changes and no axillary adenopathy. I see nothing suspicious for breast cancer. ASSESSMENT:      ICD-10-CM    1. BRCA2 gene mutation positive in female  Z15.01     Z15.02     Z15.09                     PLAN:  I will plan to see her back in 6 months for physical exam and breast MRI. She will contact me if anything significant changes.                  Edmund Subramanian PA-C

## 2023-09-20 ENCOUNTER — TRANSCRIBE ORDERS (OUTPATIENT)
Dept: ADMINISTRATIVE | Facility: HOSPITAL | Age: 58
End: 2023-09-20
Payer: COMMERCIAL

## 2023-09-20 DIAGNOSIS — R74.01 ELEVATION OF LEVELS OF LIVER TRANSAMINASE LEVELS: Primary | ICD-10-CM

## 2023-10-09 ENCOUNTER — HOSPITAL ENCOUNTER (OUTPATIENT)
Dept: ULTRASOUND IMAGING | Facility: HOSPITAL | Age: 58
Discharge: HOME | End: 2023-10-09
Payer: COMMERCIAL

## 2023-10-09 DIAGNOSIS — R74.01 ELEVATION OF LEVELS OF LIVER TRANSAMINASE LEVELS: ICD-10-CM

## 2023-10-09 PROCEDURE — 76705 ECHO EXAM OF ABDOMEN: CPT

## 2023-12-22 ENCOUNTER — TRANSCRIBE ORDERS (OUTPATIENT)
Dept: ADMINISTRATIVE | Facility: HOSPITAL | Age: 58
End: 2023-12-22
Payer: COMMERCIAL

## 2023-12-22 DIAGNOSIS — Z12.31 ENCOUNTER FOR SCREENING MAMMOGRAM FOR MALIGNANT NEOPLASM OF BREAST: Primary | ICD-10-CM

## 2023-12-29 LAB — NCCN CRITERIA FLAG: ABNORMAL

## 2023-12-29 NOTE — PROGRESS NOTES
This patient recently took the CARE risk assessment for a mammogram appointment. Based on the patient's responses, NCCN criteria for genetic testing was met.  In here questionnaire, she reported that she had previous genetic testing that showed a positive BRCA2 mutation.  I left a voicemail with my contact information if any assistance is needed.

## 2024-01-08 ENCOUNTER — HOSPITAL ENCOUNTER (OUTPATIENT)
Dept: MAMMOGRAPHY | Facility: HOSPITAL | Age: 59
Discharge: HOME | End: 2024-01-08
Admitting: OBSTETRICS & GYNECOLOGY
Payer: COMMERCIAL

## 2024-01-08 DIAGNOSIS — Z12.31 ENCOUNTER FOR SCREENING MAMMOGRAM FOR MALIGNANT NEOPLASM OF BREAST: ICD-10-CM

## 2024-01-08 PROCEDURE — 77067 SCR MAMMO BI INCL CAD: CPT

## 2024-01-08 PROCEDURE — 77063 BREAST TOMOSYNTHESIS BI: CPT

## 2024-06-27 ENCOUNTER — TRANSCRIBE ORDERS (OUTPATIENT)
Dept: ADMINISTRATIVE | Facility: HOSPITAL | Age: 59
End: 2024-06-27
Payer: COMMERCIAL

## 2024-06-27 DIAGNOSIS — Z12.39 ENCOUNTER FOR OTHER SCREENING FOR MALIGNANT NEOPLASM OF BREAST: Primary | ICD-10-CM

## 2024-07-05 ENCOUNTER — TRANSCRIBE ORDERS (OUTPATIENT)
Dept: ADMINISTRATIVE | Facility: HOSPITAL | Age: 59
End: 2024-07-05
Payer: COMMERCIAL

## 2024-07-05 DIAGNOSIS — Z15.01 GENETIC SUSCEPTIBILITY TO MALIGNANT NEOPLASM OF BREAST: Primary | ICD-10-CM

## 2024-07-17 ENCOUNTER — TRANSCRIBE ORDERS (OUTPATIENT)
Dept: ADMINISTRATIVE | Facility: HOSPITAL | Age: 59
End: 2024-07-17
Payer: COMMERCIAL

## 2024-07-17 DIAGNOSIS — Z15.01 GENETIC SUSCEPTIBILITY TO MALIGNANT NEOPLASM OF BREAST: Primary | ICD-10-CM

## 2024-07-25 ENCOUNTER — HOSPITAL ENCOUNTER (OUTPATIENT)
Dept: ULTRASOUND IMAGING | Facility: HOSPITAL | Age: 59
Discharge: HOME | End: 2024-07-25
Payer: COMMERCIAL

## 2024-07-25 ENCOUNTER — HOSPITAL ENCOUNTER (OUTPATIENT)
Dept: MAMMOGRAPHY | Facility: HOSPITAL | Age: 59
Discharge: HOME | End: 2024-07-25
Payer: COMMERCIAL

## 2024-07-25 DIAGNOSIS — Z15.01 GENETIC SUSCEPTIBILITY TO MALIGNANT NEOPLASM OF BREAST: ICD-10-CM

## 2024-07-26 ENCOUNTER — DOCUMENTATION (OUTPATIENT)
Dept: GENETICS | Facility: HOSPITAL | Age: 59
End: 2024-07-26
Payer: COMMERCIAL

## 2024-07-26 NOTE — CODE DOCUMENTATION
I was contacted by Radiologist, Dr. Hollie Mayo to coordinate a referral to the [facility] for a patient reported BRCA2 mutation.  Dr. Mayo contacted the patient's provider, Jaleel Sher, regarding recommendations for breast imaging for patients with a genetic mutation and reviewed this information with the patient as well.    I faxed a referral form and left a voicemail with that provider.  I will initate the referral once approved by that provider.

## 2024-08-01 DIAGNOSIS — Z91.89 AT HIGH RISK FOR BREAST CANCER: ICD-10-CM

## 2024-08-01 DIAGNOSIS — Z15.01 BRCA2 GENE MUTATION POSITIVE: Primary | ICD-10-CM

## 2024-08-01 DIAGNOSIS — Z15.09 BRCA2 GENE MUTATION POSITIVE: Primary | ICD-10-CM

## 2024-08-07 ENCOUNTER — OFFICE VISIT (OUTPATIENT)
Age: 59
End: 2024-08-07
Payer: COMMERCIAL

## 2024-08-07 VITALS
HEART RATE: 62 BPM | SYSTOLIC BLOOD PRESSURE: 138 MMHG | DIASTOLIC BLOOD PRESSURE: 84 MMHG | OXYGEN SATURATION: 100 % | BODY MASS INDEX: 27.12 KG/M2 | HEIGHT: 67 IN | WEIGHT: 172.8 LBS

## 2024-08-07 DIAGNOSIS — Z15.09 BRCA2 GENE MUTATION POSITIVE IN FEMALE: Primary | ICD-10-CM

## 2024-08-07 DIAGNOSIS — Z91.89 AT HIGH RISK FOR BREAST CANCER: ICD-10-CM

## 2024-08-07 DIAGNOSIS — Z15.02 BRCA2 GENE MUTATION POSITIVE IN FEMALE: Primary | ICD-10-CM

## 2024-08-07 DIAGNOSIS — Z15.01 BRCA2 GENE MUTATION POSITIVE IN FEMALE: Primary | ICD-10-CM

## 2024-08-07 PROCEDURE — 99203 OFFICE O/P NEW LOW 30 MIN: CPT

## 2024-08-07 RX ORDER — LISINOPRIL AND HYDROCHLOROTHIAZIDE 20; 12.5 MG/1; MG/1
1 TABLET ORAL DAILY
COMMUNITY

## 2024-08-07 RX ORDER — MULTIVITAMIN WITH IRON
TABLET ORAL
COMMUNITY
Start: 2024-05-01

## 2024-08-07 RX ORDER — PNV NO.95/FERROUS FUM/FOLIC AC 28MG-0.8MG
TABLET ORAL
COMMUNITY
Start: 2023-09-21

## 2024-08-07 RX ORDER — MECLIZINE HYDROCHLORIDE 25 MG/1
TABLET ORAL
COMMUNITY

## 2024-08-07 RX ORDER — GLUCOSAMINE/CHONDR SU A SOD 750-600 MG
1 TABLET ORAL DAILY
COMMUNITY
Start: 2023-01-01

## 2024-08-07 RX ORDER — ROSUVASTATIN CALCIUM 20 MG/1
1 TABLET, COATED ORAL DAILY
COMMUNITY

## 2024-08-07 NOTE — PROGRESS NOTES
Office New Patient History and Physical:     Referring Provider: Jaleel Sher AP*    Chief complaint: High risk of breast cancer discussion     Subjective .     History of present illness:  Lorrie Marmolejo is a 59 y.o. female who presents to the clinic for discussion of high risk breast cancer screening and risk reduction options. She has a known BRCA2 mutation. She was previously seeing Dr. Dent at [facility] for high risk screening and took tamoxifen for a year, but came off of it due to side effects.    Mother with ovarian cancer, brother with prostate cancer, other brother with possible prostate cancer     Breast History information:   Tyrer-Cuzick score: BRCA2 +   Prior abnormal mammograms: yes, in 2002 requiring lumpectomy   Prior breast biopsies: lumpectomy of the L breast in 2002, and benign breast biopsy in 2022  Palpable breast masses: none  Nipple discharge: none  Age at first menses: 13  Age at menopause: complete hysterectomy in 2004  Number of biological children: 2  Age at first birth: 22  Years on birth control: 20 years   Years on HRT: estrogen after hysterectomy for maybe 2 years   Family history of breast cancer: maternal grandmother  Smoking History: none  Alcohol use: none  BMI: 27.06     Review of Systems    Review of Systems - General ROS: negative  ENT ROS: negative  Respiratory ROS: no cough, shortness of breath, or wheezing  Cardiovascular ROS: no chest pain or dyspnea on exertion  Gastrointestinal ROS: no abdominal pain, change in bowel habits, or black or bloody stools  Genito-Urinary ROS: no dysuria, trouble voiding, or hematuria  Dermatological ROS: negative   Breast ROS: negative for breast lumps  Hematological and Lymphatic ROS: negative  Musculoskeletal ROS: negative   Neurological ROS: no TIA or stroke symptoms    Psychological ROS: negative  Endocrine ROS: negative    History  Past Medical History:   Diagnosis Date    BRCA2 positive     Disease of thyroid gland      Hyperlipidemia     Hypertension     Sebaceous hyperplasia     Squamous cell carcinoma 08/08/2016    left cheek   ,   Past Surgical History:   Procedure Laterality Date    BREAST BIOPSY Left     CHOLECYSTECTOMY      COLONOSCOPY N/A 01/12/2017    Procedure: COLONOSCOPY WITH ANESTHESIA;  Surgeon: Jose R Graves MD;  Location: [facility] ENDOSCOPY;  Service:     EXCISION LESION  07/21/2016    sebacous hyperplasia    HYSTERECTOMY      SQUAMOUS CELL CARCINOMA EXCISION  08/08/2016    left cheek   ,   Family History   Problem Relation Age of Onset    Ovarian cancer Mother     Breast cancer Maternal Grandmother     Hyperlipidemia Other     Hypertension Other     Colon cancer Neg Hx     Colon polyps Neg Hx     Diabetes Neg Hx     Heart disease Neg Hx    ,   Social History     Tobacco Use    Smoking status: Never   Vaping Use    Vaping status: Never Used   Substance Use Topics    Alcohol use: No    Drug use: Never   , (Not in a hospital admission)   and Allergies:  Patient has no known allergies.    Current Outpatient Medications:     Biotin (BIOTIN MAXIMUM STRENGTH) 10 MG tablet, Take  by mouth Daily., Disp: , Rfl:     cetirizine (zyrTEC) 10 MG tablet, Take 1 tablet by mouth Daily., Disp: , Rfl:     Cholecalciferol (Vitamin D-3) 125 MCG (5000 UT) tablet, , Disp: , Rfl:     ferrous sulfate 325 (65 Fe) MG tablet, , Disp: , Rfl:     fluticasone (FLONASE) 50 MCG/ACT nasal spray, 2 sprays by Each Nare route Every Night., Disp: 15.8 mL, Rfl: 0    lisinopril-hydrochlorothiazide (PRINZIDE,ZESTORETIC) 20-12.5 MG per tablet, Take 1 tablet by mouth Daily., Disp: , Rfl:     Lutein-Zeaxanthin 25-5 MG capsule, 1 tablet Daily., Disp: , Rfl:     Magnesium 250 MG tablet, , Disp: , Rfl:     meclizine (ANTIVERT) 25 MG tablet, TAKE 1 TABLET BY MOUTH 3 TIMES A DAY AS NEEDED FOR 7 DAYS., Disp: , Rfl:     omeprazole (priLOSEC) 20 MG capsule, Take 1 capsule by mouth Daily., Disp: , Rfl:     Potassium 99 MG tablet, , Disp: , Rfl:     rosuvastatin  "(CRESTOR) 20 MG tablet, Take 1 tablet by mouth Daily., Disp: , Rfl:     Objective     Vital Signs   /84 (BP Location: Left arm, Patient Position: Sitting, Cuff Size: Adult)   Pulse 62   Ht 170.2 cm (67.01\")   Wt 78.4 kg (172 lb 12.8 oz)   SpO2 100%   BMI 27.06 kg/m²      Physical Exam:  General appearance - alert, well appearing, and in no distress  Mental status - normal mood, behavior, speech, dress, motor activity, and thought processes  Eyes - sclera anicteric  Neck - supple, no significant adenopathy  Chest - no tachypnea, retractions or cyanosis  Heart - normal rate and regular rhythm  Neurological - alert, oriented, normal speech, no focal findings or movement disorder noted  Extremities - no pedal edema noted  Skin - normal coloration and turgor, no rashes, no suspicious skin lesions noted  Breast Exam: Bilateral breasts without obvious deformities. Bilateral nipples everted. Patient examined in the supine position with the ipsilateral arm above the head. No palpable masses bilaterally. No nipple discharge bilaterally. No palpable axillary nor supraclavicular adenopathy bilaterally.     Results Review:  Result Review :            Assessment & Plan       Diagnoses and all orders for this visit:    1. BRCA2 gene mutation positive in female (Primary)  -     MRI Breast Bilateral Screening With & Without Contrast; Future  -     Ambulatory Referral to Obstetrics / Gynecology         Lorrie Marmolejo is a 59 y.o. female with a high risk of breast cancer based on her genetic testing which showed BRCA2+.  I went over risk reduction and early detection strategies with her. (1) Early detection: twice yearly clinical breast exam. Yearly bilateral breast MRI and yearly bilateral mammogram so there is imaging every 6 months. (2) Prevention: First step of prevention is lifestyle modification with healthy diet, maintaining a healthy BMI, smoking cessation (including vaping), regular exercise, <2 alcoholic drinks " per day. The second option on prevention is chemoprophylaxis with anti-hormone therapy. We discussed the potential 30-40% risk reduction with medication. I discussed the risks and benefits in detail and she elected to defer at this time. Third option is surgical prevention with bilateral prophylactic mastectomies with or without reconstruction. We discussed that this is a big operation and will require 1-2 months of recovery with risks of bleeding, infection, damage to surrounding structures. She understands that bilateral mastectomy does not decrease her risk to 0%. She has elected to defer at this time. My office has scheduled her bilateral breast MRI for now and mammogram for 6 months from now. I will call her with the results of the MRI. She will follow up with me after breast mammogram. She is to call if she has any concerns in the meantime.     Follow up:     BMI is >= 25 and <30. (Overweight) The following options were offered after discussion;: weight loss educational material (shared in after visit summary)    Return in about 6 months (around 2/7/2025) for 6 months [facility] f/u .        Natalia Christopher PA-C  08/07/24  09:31 CDT

## 2024-08-20 ENCOUNTER — HOSPITAL ENCOUNTER (OUTPATIENT)
Dept: MRI IMAGING | Facility: HOSPITAL | Age: 59
Discharge: HOME | End: 2024-08-20
Payer: COMMERCIAL

## 2024-08-20 DIAGNOSIS — Z15.01 BRCA2 GENE MUTATION POSITIVE IN FEMALE: ICD-10-CM

## 2024-08-20 DIAGNOSIS — Z15.02 BRCA2 GENE MUTATION POSITIVE IN FEMALE: ICD-10-CM

## 2024-08-20 DIAGNOSIS — Z15.09 BRCA2 GENE MUTATION POSITIVE IN FEMALE: ICD-10-CM

## 2024-08-20 PROCEDURE — 77049 MRI BREAST C-+ W/CAD BI: CPT

## 2024-08-20 PROCEDURE — A9577 INJ MULTIHANCE: HCPCS

## 2024-08-20 PROCEDURE — 0 GADOBENATE DIMEGLUMINE 529 MG/ML SOLUTION

## 2024-08-20 RX ADMIN — GADOBENATE DIMEGLUMINE 16 ML: 529 INJECTION, SOLUTION INTRAVENOUS at 15:13

## 2024-08-21 NOTE — PROGRESS NOTES
Benign MRI. No concerning findings. Keep follow up in February. Call with any questions or concerns before then!     Thanks,   Natalia BARNES

## 2024-11-18 ENCOUNTER — OFFICE VISIT (OUTPATIENT)
Age: 59
End: 2024-11-18
Payer: COMMERCIAL

## 2024-11-18 VITALS
BODY MASS INDEX: 27.92 KG/M2 | DIASTOLIC BLOOD PRESSURE: 90 MMHG | HEIGHT: 67 IN | WEIGHT: 177.9 LBS | SYSTOLIC BLOOD PRESSURE: 142 MMHG

## 2024-11-18 DIAGNOSIS — R30.0 DYSURIA: ICD-10-CM

## 2024-11-18 DIAGNOSIS — Z78.9 NON-SMOKER: ICD-10-CM

## 2024-11-18 DIAGNOSIS — N95.2 ATROPHIC VAGINITIS: ICD-10-CM

## 2024-11-18 DIAGNOSIS — N39.46 MIXED INCONTINENCE: ICD-10-CM

## 2024-11-18 DIAGNOSIS — N81.11 CYSTOCELE, MIDLINE: ICD-10-CM

## 2024-11-18 DIAGNOSIS — Z15.01 BRCA2 GENE MUTATION POSITIVE: Primary | ICD-10-CM

## 2024-11-18 DIAGNOSIS — Z15.09 BRCA2 GENE MUTATION POSITIVE: Primary | ICD-10-CM

## 2024-11-18 LAB
BILIRUB BLD-MCNC: NEGATIVE MG/DL
CLARITY, POC: CLEAR
COLOR UR: YELLOW
GLUCOSE UR STRIP-MCNC: NEGATIVE MG/DL
KETONES UR QL: NEGATIVE
LEUKOCYTE EST, POC: NEGATIVE
NITRITE UR-MCNC: NEGATIVE MG/ML
PH UR: 7 [PH] (ref 5–8)
PROT UR STRIP-MCNC: NEGATIVE MG/DL
RBC # UR STRIP: ABNORMAL /UL
SP GR UR: 1.01 (ref 1–1.03)
UROBILINOGEN UR QL: NORMAL

## 2024-11-18 PROCEDURE — 81002 URINALYSIS NONAUTO W/O SCOPE: CPT | Performed by: OBSTETRICS & GYNECOLOGY

## 2024-11-18 PROCEDURE — 99203 OFFICE O/P NEW LOW 30 MIN: CPT | Performed by: OBSTETRICS & GYNECOLOGY

## 2024-11-18 RX ORDER — BIOTIN 10000 MCG
1 CAPSULE ORAL DAILY
COMMUNITY

## 2024-11-18 RX ORDER — MELOXICAM 15 MG/1
1 TABLET ORAL DAILY
COMMUNITY
Start: 2024-07-10

## 2024-11-18 NOTE — PROGRESS NOTES
"Chief Complaint  Gynecologic Exam (Pt here as new gyn referral from Natalia Christopher for BRCA2+ gene mutation ,last annual 12-, last pap 2003 s/p hysterectomy d/t ovarian cyst and abnormal paps , last mammogram 08/20/2024 normal, last colonoscopy 01/12/2017. Pt c/o possible UTI, painful intercourse, spotting afterwards, and \"heavy\" feeling in vagina.)    Subjective        Lorrie Marmolejo presents to [facility] with complaints of vaginal pressure, vaginal dryness and post coital bleeding. Pt reports intercourse is painful. Has used over the counter lubricants without relief of symptoms.  BRCA + therefore unable to use estrogens.  Pt with mixed incontinence and urgency.  UA in office today was normal.  Pt with prior TVH/BSO due to ovarian cyst and abnormal pap smears. Pt with BSO but is BRCA +.  Pelvic US here today negative.   Gynecologic Exam        Objective   Vital Signs:  /90   Ht 170.2 cm (67.01\")   Wt 80.7 kg (177 lb 14.4 oz)   BMI 27.86 kg/m²   Estimated body mass index is 27.86 kg/m² as calculated from the following:    Height as of this encounter: 170.2 cm (67.01\").    Weight as of this encounter: 80.7 kg (177 lb 14.4 oz).            Physical Exam  Exam conducted with a chaperone present.   Constitutional:       Appearance: Normal appearance.   Chest:   Breasts:     Right: Normal. No inverted nipple, mass, nipple discharge or skin change.      Left: Normal. No inverted nipple, mass, nipple discharge or skin change.   Abdominal:      General: Abdomen is flat. Bowel sounds are normal.      Palpations: Abdomen is soft.   Genitourinary:     Comments: Normal external genitalia, vaginal mucosa atrophic  without lesions, cervix absent, vaginal cufff well suspended, however 2nd degree cystocele noted on exam, no rectocele, bimanual exam, no masses,  nontender, no vaginal discharge  Neurological:      Mental Status: She is alert.        Result Review :                Assessment " and Plan   Diagnoses and all orders for this visit:    1. BRCA2 gene mutation positive (Primary)    2. Non-smoker    3. Dysuria  -     POC Urinalysis Dipstick  -     Urine Culture - Urine, Urine, Random Void    4. Cystocele, midline    5. Mixed incontinence    6. Atrophic vaginitis  Atrophic vaginitis on exam.  Pt BRCA + therefore unable to use estrogens.  Hyaluronic acid/Vit E compounded called to [place]. Pt with second degree midline cystocele.  Pt offered pelvic floor PT, however wishes to try exercises at home. Discussed surgical correction of cystocele and KEITH however, pt feels her symptoms are not bad enough for surgery at this time.  RTC 3 months for follow up after using cream and exercises at home.            Follow Up   No follow-ups on file.  Patient was given instructions and counseling regarding her condition or for health maintenance advice. Please see specific information pulled into the AVS if appropriate.

## 2024-11-19 LAB
BACTERIA UR CULT: NO GROWTH
BACTERIA UR CULT: NORMAL

## 2025-01-04 LAB — NCCN CRITERIA FLAG: ABNORMAL

## 2025-01-07 ENCOUNTER — DOCUMENTATION (OUTPATIENT)
Dept: GENETICS | Facility: HOSPITAL | Age: 60
End: 2025-01-07
Payer: COMMERCIAL

## 2025-01-07 NOTE — PROGRESS NOTES
Meets NCCN Criteria for Genetic Testing  Declines genetic testing? Y   Reason for decline? Previous Testing   If Reason for Decline is Previous Testing    [place] CARE     Non-CARE Y   Non-CARE Confirmation    Navigator Confirmed?     Patient Reported? Y   Elevated Tyrer-Cuzick Score ? Or Equal to 20%    Declines referral?     Reason for decline?

## 2025-01-08 ENCOUNTER — OFFICE VISIT (OUTPATIENT)
Age: 60
End: 2025-01-08
Payer: COMMERCIAL

## 2025-01-08 ENCOUNTER — TELEPHONE (OUTPATIENT)
Age: 60
End: 2025-01-08
Payer: COMMERCIAL

## 2025-01-08 VITALS
WEIGHT: 179.3 LBS | HEIGHT: 67 IN | BODY MASS INDEX: 28.14 KG/M2 | DIASTOLIC BLOOD PRESSURE: 94 MMHG | SYSTOLIC BLOOD PRESSURE: 142 MMHG

## 2025-01-08 DIAGNOSIS — N95.2 ATROPHIC VAGINITIS: ICD-10-CM

## 2025-01-08 DIAGNOSIS — Z15.09 BRCA2 GENE MUTATION POSITIVE: ICD-10-CM

## 2025-01-08 DIAGNOSIS — Z15.01 BRCA2 GENE MUTATION POSITIVE: ICD-10-CM

## 2025-01-08 DIAGNOSIS — N81.11 CYSTOCELE, MIDLINE: ICD-10-CM

## 2025-01-08 DIAGNOSIS — Z00.00 ENCOUNTER FOR ANNUAL PHYSICAL EXAMINATION EXCLUDING GYNECOLOGICAL EXAMINATION IN A PATIENT OLDER THAN 17 YEARS: Primary | ICD-10-CM

## 2025-01-08 RX ORDER — ROSUVASTATIN CALCIUM 20 MG/1
20 TABLET, COATED ORAL DAILY
COMMUNITY

## 2025-01-14 ENCOUNTER — PATIENT ROUNDING (BHMG ONLY) (OUTPATIENT)
Dept: URGENT CARE | Facility: CLINIC | Age: 60
End: 2025-01-14
Payer: COMMERCIAL

## 2025-01-14 NOTE — ED NOTES
Thank you for letting us care for you in your recent visit to our urgent care center. We would love to hear about your experience with us. Was this the first time you have visited our location?    We’re always looking for ways to make our patients’ experiences even better. Do you have any recommendations on ways we may improve?     I appreciate you taking the time to respond. Please be on the lookout for a survey about your recent visit from [employer] via text or email. We would greatly appreciate if you could fill that out and turn it back in. We want your voice to be heard and we value your feedback.   Thank you for choosing [facility] for your healthcare needs.

## 2025-01-28 ENCOUNTER — TRANSCRIBE ORDERS (OUTPATIENT)
Dept: ADMINISTRATIVE | Facility: HOSPITAL | Age: 60
End: 2025-01-28
Payer: COMMERCIAL

## 2025-01-28 ENCOUNTER — HOSPITAL ENCOUNTER (OUTPATIENT)
Dept: GENERAL RADIOLOGY | Facility: HOSPITAL | Age: 60
Discharge: HOME | End: 2025-01-28
Payer: COMMERCIAL

## 2025-01-28 DIAGNOSIS — R05.1 ACUTE COUGH: ICD-10-CM

## 2025-01-28 DIAGNOSIS — R05.1 ACUTE COUGH: Primary | ICD-10-CM

## 2025-01-28 PROCEDURE — 71046 X-RAY EXAM CHEST 2 VIEWS: CPT

## 2025-02-03 ENCOUNTER — HOSPITAL ENCOUNTER (OUTPATIENT)
Dept: MAMMOGRAPHY | Facility: HOSPITAL | Age: 60
Discharge: HOME | End: 2025-02-03
Payer: COMMERCIAL

## 2025-02-03 DIAGNOSIS — Z15.02 BRCA2 GENE MUTATION POSITIVE IN FEMALE: ICD-10-CM

## 2025-02-03 DIAGNOSIS — Z15.09 BRCA2 GENE MUTATION POSITIVE IN FEMALE: ICD-10-CM

## 2025-02-03 DIAGNOSIS — Z91.89 AT HIGH RISK FOR BREAST CANCER: ICD-10-CM

## 2025-02-03 DIAGNOSIS — Z15.01 BRCA2 GENE MUTATION POSITIVE IN FEMALE: ICD-10-CM

## 2025-02-03 PROCEDURE — 77067 SCR MAMMO BI INCL CAD: CPT

## 2025-02-03 PROCEDURE — 77063 BREAST TOMOSYNTHESIS BI: CPT

## 2025-02-05 NOTE — PROGRESS NOTES
Benign mammogram. No concerning findings. Keep follow up appointment on 3/26/25 for continued appropriate high risk screening management.     Thanks,  Natalia BARNES

## 2025-03-05 NOTE — PROGRESS NOTES
"Subjective     Lorrie Marmolejo is a 59 y.o. female here for annual exam. Recently started on Vit E/hyaluronic acid vaginal supp for atrophic changes. Pt has noticed a significant improvement and desires to continue.  Is using pelvic floor exercises.  No breast changes. Gets follow up with Dr. Vazquez. Denies vaginal discharge, pelvic pain. Prior hysterectomy with BSO.      Gynecologic Exam          /94   Ht 170.2 cm (67.01\")   Wt 81.3 kg (179 lb 4.8 oz)   BMI 28.08 kg/m²     Outpatient Encounter Medications as of 1/8/2025   Medication Sig Dispense Refill    Biotin 10 MG capsule 1 tablet Daily.      cetirizine (zyrTEC) 10 MG tablet Take 1 tablet by mouth Daily.      Cholecalciferol (Vitamin D-3) 125 MCG (5000 UT) tablet       ferrous sulfate 325 (65 Fe) MG tablet       fluticasone (FLONASE) 50 MCG/ACT nasal spray 2 sprays by Each Nare route Every Night. 15.8 mL 0    lisinopril-hydrochlorothiazide (PRINZIDE,ZESTORETIC) 20-12.5 MG per tablet Take 1 tablet by mouth Daily.      Lutein-Zeaxanthin 25-5 MG capsule 1 tablet Daily.      Magnesium 250 MG tablet       omeprazole (priLOSEC) 20 MG capsule Take 1 capsule by mouth Daily.      Potassium 99 MG tablet       rosuvastatin (CRESTOR) 20 MG tablet Take 1 tablet by mouth Daily.      [DISCONTINUED] meloxicam (MOBIC) 15 MG tablet Take 1 tablet by mouth Daily.       No facility-administered encounter medications on file as of 1/8/2025.       Surgical History  Past Surgical History:   Procedure Laterality Date    BREAST BIOPSY Left     CHOLECYSTECTOMY      COLONOSCOPY N/A 01/12/2017    Procedure: COLONOSCOPY WITH ANESTHESIA;  Surgeon: Jose R Graves MD;  Location: [facility] ENDOSCOPY;  Service:     EXCISION LESION  07/21/2016    sebacous hyperplasia    HYSTERECTOMY  2004    SQUAMOUS CELL CARCINOMA EXCISION  08/08/2016    left cheek       Family History  Family History   Problem Relation Age of Onset    Melanoma Father     Ovarian cancer Mother     Prostate cancer " Care Due:                  Date            Visit Type   Department     Provider  --------------------------------------------------------------------------------                                ESTABLISHED                              PATIENT -    [facility] INTERNAL  Last Visit: 08-      VIRTUAL      MEDICINE       Geeta EDWARDS [place]  Next Visit: None Scheduled  None         None Found                                                            Last  Test          Frequency    Reason                     Performed    Due Date  --------------------------------------------------------------------------------    CMP.........  12 months..  hydroCHLOROthiazide,       07- 07-                             losartan, rosuvastatin...    Lipid Panel.  12 months..  rosuvastatin.............  07- 07-    [facility] Embedded Care Due Messages. Reference number: [id number].   3/05/2025 1:29:09 AM CST   Brother     Breast cancer Maternal Grandmother     Hyperlipidemia Other     Hypertension Other     Colon cancer Neg Hx     Uterine cancer Neg Hx        The following portions of the patient's history were reviewed and updated as appropriate: allergies, current medications, past family history, past medical history, past social history, past surgical history, and problem list.    Review of Systems    Objective   Physical Exam  Exam conducted with a chaperone present.   Constitutional:       Appearance: Normal appearance.   Cardiovascular:      Rate and Rhythm: Normal rate and regular rhythm.      Pulses: Normal pulses.   Pulmonary:      Effort: Pulmonary effort is normal.      Breath sounds: Normal breath sounds.   Chest:   Breasts:     Right: Normal. No inverted nipple, mass, nipple discharge or skin change.      Left: Normal. No inverted nipple, mass, nipple discharge or skin change.   Abdominal:      General: Abdomen is flat. Bowel sounds are normal.      Palpations: Abdomen is soft.   Genitourinary:     General: Normal vulva.      Labia:         Right: No tenderness or lesion.         Left: No tenderness or lesion.       Urethra: No prolapse.      Vagina: Normal. Prolapsed vaginal walls present.      Uterus: Absent.       Adnexa:         Right: No mass or tenderness.          Left: No mass or tenderness.        Rectum: Normal.      Comments: Normal external genitalia, vaginal mucosa atrophic but without lesions, midline cystocele,  bimanual examno masses, nontender, no vaginal discharge  Musculoskeletal:      Cervical back: Normal range of motion and neck supple.   Neurological:      Mental Status: She is alert.   Psychiatric:         Mood and Affect: Mood normal.         Behavior: Behavior normal.         Assessment & Plan   Diagnoses and all orders for this visit:    1. Encounter for annual physical examination excluding gynecological examination in a patient older than 17 years (Primary)  60 y/o CF here for  annual exam. No pap smear per ASCCP guidelines. Normal breast and pelvic exam. RTC yearly.    2. BRCA2 gene mutation positive    3. Atrophic vaginitis  Refill non-hormonal vaginal cream from [facility].    4. Cystocele, midline  Pt to continue pelvic floor exercises.     BMI Body mass index is 28.08 kg/m²..   Colonoscopy: Up to date  Mammogram: Up to date  DEXA:  at age 66  Pap smear, per ASCCP guidelines, not applicable  STI screening: declines  Contraception: hysterectomy    AVS/Patient education handout on the following as well w/discussion  Encouraged self breast awareness.  Encouraged proactive weight management and importance of maintaining a healthy weight.   Encouraged regular exercise and the importance of same, in regards to a healthy heart as well as helping to maintain her weight and improving her mental health.                 Hollie Conner MD  1/8/2025

## 2025-04-12 PROBLEM — S05.02XA LEFT CORNEAL ABRASION: Status: ACTIVE | Noted: 2025-04-12

## 2025-04-23 ENCOUNTER — OFFICE VISIT (OUTPATIENT)
Dept: SURGERY | Facility: CLINIC | Age: 60
End: 2025-04-23
Payer: COMMERCIAL

## 2025-04-23 VITALS
DIASTOLIC BLOOD PRESSURE: 78 MMHG | WEIGHT: 175 LBS | OXYGEN SATURATION: 97 % | SYSTOLIC BLOOD PRESSURE: 126 MMHG | HEIGHT: 67 IN | BODY MASS INDEX: 27.47 KG/M2 | HEART RATE: 80 BPM

## 2025-04-23 DIAGNOSIS — Z15.02 BRCA2 GENE MUTATION POSITIVE IN FEMALE: Primary | ICD-10-CM

## 2025-04-23 DIAGNOSIS — Z15.01 BRCA2 GENE MUTATION POSITIVE IN FEMALE: Primary | ICD-10-CM

## 2025-04-23 DIAGNOSIS — Z15.09 BRCA2 GENE MUTATION POSITIVE IN FEMALE: Primary | ICD-10-CM

## 2025-04-23 NOTE — PROGRESS NOTES
Office Established Patient Note:     Referring Provider: No ref. provider found    Chief Complaint   Patient presents with    Follow-up       Subjective .     History of present illness:  Lorrie Marmolejo is a 59 y.o. female who presents to the clinic for 6 month breast follow up. She has been followed in our high risk of breast cancer clinic due to having a BRCA 2 mutation. Today she has no concerns with her breasts. She denies palpable masses, nipple discharge, or skin changes of either breast.     BMI is 27.41. She is a non smoker. She does not take any blood thinners.     Review of Systems    Review of Systems - General ROS: negative  ENT ROS: negative  Respiratory ROS: no cough, shortness of breath, or wheezing  Cardiovascular ROS: negative  Gastrointestinal ROS: no abdominal pain, change in bowel habits, or black or bloody stools  Genito-Urinary ROS: no dysuria, trouble voiding, or hematuria  Dermatological ROS: negative   Breast ROS: negative for breast lumps  Hematological and Lymphatic ROS: negative  Musculoskeletal ROS: negative   Neurological ROS: no TIA or stroke symptoms    Psychological ROS: negative  Endocrine ROS: negative    History  Past Medical History:   Diagnosis Date    BRCA2 positive     Disease of thyroid gland     Hyperlipidemia     Hypertension     Sebaceous hyperplasia     Squamous cell carcinoma 08/08/2016    left cheek   ,   Past Surgical History:   Procedure Laterality Date    BREAST BIOPSY Left     CHOLECYSTECTOMY      COLONOSCOPY N/A 01/12/2017    Procedure: COLONOSCOPY WITH ANESTHESIA;  Surgeon: Jose R Graves MD;  Location: [facility] ENDOSCOPY;  Service:     EXCISION LESION  07/21/2016    sebacous hyperplasia    HYSTERECTOMY  2004    SQUAMOUS CELL CARCINOMA EXCISION  08/08/2016    left cheek   ,   Family History   Problem Relation Age of Onset    Melanoma Father     Ovarian cancer Mother     Prostate cancer Brother     Breast cancer Maternal Grandmother     Hyperlipidemia Other      "Hypertension Other     Colon cancer Neg Hx     Uterine cancer Neg Hx    ,   Social History     Tobacco Use    Smoking status: Never     Passive exposure: Never    Smokeless tobacco: Never   Vaping Use    Vaping status: Never Used   Substance Use Topics    Alcohol use: No    Drug use: Never   , (Not in a hospital admission)   and Allergies:  Patient has no known allergies.    Current Outpatient Medications:     albuterol sulfate  (90 Base) MCG/ACT inhaler, INHALE 2 PUFFS EVERY 4-6 HOURS BY INHALATION ROUTE AS NEEDED., Disp: , Rfl:     Biotin 10 MG capsule, 1 tablet Daily., Disp: , Rfl:     cetirizine (zyrTEC) 10 MG tablet, Take 1 tablet by mouth Daily., Disp: , Rfl:     Cholecalciferol (Vitamin D-3) 125 MCG (5000 UT) tablet, Take As Directed., Disp: , Rfl:     ferrous sulfate 325 (65 Fe) MG tablet, Take 1 tablet by mouth Daily With Breakfast., Disp: , Rfl:     fluticasone (FLONASE) 50 MCG/ACT nasal spray, 2 sprays by Each Nare route Every Night., Disp: 15.8 mL, Rfl: 0    lisinopril-hydrochlorothiazide (PRINZIDE,ZESTORETIC) 20-12.5 MG per tablet, Take 1 tablet by mouth Daily., Disp: , Rfl:     Lutein-Zeaxanthin 25-5 MG capsule, 1 tablet Daily., Disp: , Rfl:     Magnesium 250 MG tablet, Take As Directed., Disp: , Rfl:     omeprazole (priLOSEC) 20 MG capsule, Take 1 capsule by mouth Daily., Disp: , Rfl:     Potassium 99 MG tablet, Take As Directed., Disp: , Rfl:     rosuvastatin (CRESTOR) 20 MG tablet, Take 1 tablet by mouth Daily., Disp: , Rfl:     tobramycin 0.3 % solution ophthalmic solution, Administer 1 drop into the left eye Every 6 (Six) Hours., Disp: 3 mL, Rfl: 0    Objective     Vital Signs   /78 (BP Location: Right arm, Patient Position: Sitting, Cuff Size: Adult)   Pulse 80   Ht 170.2 cm (67\")   Wt 79.4 kg (175 lb)   SpO2 97%   BMI 27.41 kg/m²      Physical Exam:  General appearance - alert, well appearing, and in no distress  Mental status - normal mood, behavior, speech, dress, motor " activity, and thought processes  Eyes - sclera anicteric  Neck - supple, no significant adenopathy  Chest - no tachypnea, retractions or cyanosis  Heart - normal rate and regular rhythm  Breasts - breasts appear normal, no suspicious masses, no skin or nipple changes or axillary nodes  Neurological - alert, oriented, normal speech, no focal findings or movement disorder noted  Skin - normal coloration and turgor, no rashes, no suspicious skin lesions noted    Results Review:  Result Review :            Mammo Screening Digital Tomosynthesis Bilateral With CAD (02/03/2025 15:09)   The breasts are  almost entirely fatty .  Quantra Breast Density Category (QDC) = A.     The parenchymal tissue has a normal and symmetric pattern of  distribution.  There are no suspicious micro calcifications or areas of architectural  distortion.        Summary:  1. Benign mammograms.  2. BI-RADS category 1.  3. Screening mammography is recommended in one year.    Assessment & Plan       Diagnoses and all orders for this visit:    1. BRCA2 gene mutation positive in female (Primary)  -     Cancel: MRI Breast Bilateral Screening With & Without Contrast; Future  -     MRI Breast Bilateral Screening With & Without Contrast; Future         Lorrie Marmolejo is a 59 y.o. female who presents to the clinic for high risk of breast cancer follow up. Her most recent imaging was done on 2/3/25 and showed BIRADS 1 findings. At this time, the patient is due for MRI in 4 months. I have placed the order for this. She will follow up in office in August 2025 for discussion of results and clinical breast exam. She is to call for an appointment sooner if she has any new problems or concerns. She voices understanding and is agreeable to the plan.     Follow up:           Return in about 4 months (around 8/23/2025) for 4 month breast f/u .        Natalia Christopher PA-C  04/23/25  13:01 CDT

## 2025-07-14 ENCOUNTER — OFFICE VISIT (OUTPATIENT)
Age: 60
End: 2025-07-14
Payer: COMMERCIAL

## 2025-07-14 VITALS — HEIGHT: 67 IN | WEIGHT: 177 LBS | BODY MASS INDEX: 27.78 KG/M2

## 2025-07-14 DIAGNOSIS — M79.671 PAIN IN RIGHT FOOT: Primary | ICD-10-CM

## 2025-07-14 DIAGNOSIS — M76.71 PERONEAL TENDONITIS, RIGHT: ICD-10-CM

## 2025-07-14 PROCEDURE — 99203 OFFICE O/P NEW LOW 30 MIN: CPT

## 2025-07-14 RX ORDER — GLUCOSAMINE/CHONDR SU A SOD 750-600 MG
1 TABLET ORAL DAILY
COMMUNITY

## 2025-07-14 RX ORDER — METHYLPREDNISOLONE 4 MG/1
TABLET ORAL
Qty: 1 KIT | Refills: 0 | Status: SHIPPED | OUTPATIENT
Start: 2025-07-14

## 2025-07-14 RX ORDER — GINSENG 100 MG
CAPSULE ORAL
COMMUNITY
Start: 2021-08-09

## 2025-07-14 RX ORDER — CETIRIZINE HYDROCHLORIDE 10 MG/1
10 TABLET ORAL DAILY
COMMUNITY

## 2025-07-14 RX ORDER — OMEPRAZOLE 20 MG/1
20 CAPSULE, DELAYED RELEASE ORAL DAILY
COMMUNITY
Start: 2020-07-11

## 2025-07-14 RX ORDER — ROSUVASTATIN CALCIUM 20 MG/1
20 TABLET, COATED ORAL DAILY
COMMUNITY

## 2025-07-14 RX ORDER — CHOLECALCIFEROL (VITAMIN D3) 50 MCG
TABLET ORAL
COMMUNITY
Start: 2021-08-09

## 2025-07-14 RX ORDER — LISINOPRIL AND HYDROCHLOROTHIAZIDE 12.5; 2 MG/1; MG/1
1 TABLET ORAL DAILY
COMMUNITY

## 2025-07-14 NOTE — PROGRESS NOTES
Orthopaedic History and Physical - New Patient    NAME:  Betzaida Canales   : 1965  MRN: [id number]      2025     CHIEF COMPLAINT:  right foot pain      HISTORY OF PRESENT ILLNESS:   The patient is a 60 y.o. female who presents to the office for evaluation and treatment of nontraumatic right foot pain for at least past 6 months.  Pain is described as burning, associated with radiation up the ankle worse with activity. Treatment has consisted of NSAIDs, lidocaine, arch supports.  Pain is rated a 5-6/10.    Past Medical History:        Diagnosis Date    Anxiety     BRCA2 gene mutation positive 2021    Breast cyst 2021    Cancer (HCC)     scc-facial    Hypertension     MVP (mitral valve prolapse)        Past Surgical History:        Procedure Laterality Date    BREAST BIOPSY Left     benign    BREAST BIOPSY Left     benign    CHOLECYSTECTOMY      COLONOSCOPY  2017    HYSTERECTOMY (CERVIX STATUS UNKNOWN)      age 39-total,vaginal    LASIK      OVARY REMOVAL Bilateral     age 39       Current Medications:   Prior to Admission medications    Medication Sig Start Date End Date Taking? Authorizing Provider   cetirizine (ZYRTEC) 10 MG tablet Take 1 tablet by mouth daily   Yes Apollo Limon MD   vitamin D (CHOLECALCIFEROL) 50 MCG ( [place]) TABS tablet  21  Yes Apollo Limon MD   IRON, FERROUS GLUCONATE, PO  24  Yes Apollo Limon MD   lisinopril-hydroCHLOROthiazide (PRINZIDE;ZESTORETIC) 20-12.5 MG per tablet Take 1 tablet by mouth daily   Yes Apollo Limon MD   Lutein-Zeaxanthin 25-5 MG CAPS Take 1 tablet by mouth daily   Yes Apollo Limon MD   omeprazole (PRILOSEC) 20 MG delayed release capsule Take 1 capsule by mouth daily 20  Yes Apollo Limon MD   rosuvastatin (CRESTOR) 20 MG tablet Take 1 tablet by mouth daily   Yes Apollo Limon MD   zinc 50 MG TABS tablet  21  Yes Provider, Historical, MD   methylPREDNISolone

## 2025-07-28 ENCOUNTER — TELEPHONE (OUTPATIENT)
Dept: SURGERY | Facility: CLINIC | Age: 60
End: 2025-07-28
Payer: COMMERCIAL

## 2025-08-26 ENCOUNTER — HOSPITAL ENCOUNTER (OUTPATIENT)
Dept: MRI IMAGING | Facility: HOSPITAL | Age: 60
Discharge: HOME | End: 2025-08-26
Payer: COMMERCIAL

## 2025-08-26 DIAGNOSIS — Z15.09 BRCA2 GENE MUTATION POSITIVE IN FEMALE: ICD-10-CM

## 2025-08-26 DIAGNOSIS — Z15.01 BRCA2 GENE MUTATION POSITIVE IN FEMALE: ICD-10-CM

## 2025-08-26 DIAGNOSIS — Z15.02 BRCA2 GENE MUTATION POSITIVE IN FEMALE: ICD-10-CM

## 2025-08-26 PROCEDURE — A9573 GADOPICLENOL 0.5 MMOL/ML SOLUTION: HCPCS

## 2025-08-26 PROCEDURE — 76376 3D RENDER W/INTRP POSTPROCES: CPT

## 2025-08-26 PROCEDURE — 25510000001 GADOPICLENOL 0.5 MMOL/ML SOLUTION

## 2025-08-26 PROCEDURE — 77049 MRI BREAST C-+ W/CAD BI: CPT

## 2025-08-26 RX ADMIN — GADOPICLENOL 7.5 ML: 485.1 INJECTION INTRAVENOUS at 15:38
